# Patient Record
Sex: MALE | Race: WHITE | NOT HISPANIC OR LATINO
[De-identification: names, ages, dates, MRNs, and addresses within clinical notes are randomized per-mention and may not be internally consistent; named-entity substitution may affect disease eponyms.]

---

## 2017-05-14 ENCOUNTER — RESULT REVIEW (OUTPATIENT)
Age: 42
End: 2017-05-14

## 2019-11-05 ENCOUNTER — RESULT REVIEW (OUTPATIENT)
Age: 44
End: 2019-11-05

## 2021-09-02 ENCOUNTER — APPOINTMENT (OUTPATIENT)
Dept: UROLOGY | Facility: CLINIC | Age: 46
End: 2021-09-02
Payer: COMMERCIAL

## 2021-09-02 VITALS
BODY MASS INDEX: 24.38 KG/M2 | TEMPERATURE: 97.9 F | DIASTOLIC BLOOD PRESSURE: 72 MMHG | HEART RATE: 50 BPM | HEIGHT: 72 IN | SYSTOLIC BLOOD PRESSURE: 115 MMHG | WEIGHT: 180 LBS | OXYGEN SATURATION: 99 %

## 2021-09-02 DIAGNOSIS — Z78.9 OTHER SPECIFIED HEALTH STATUS: ICD-10-CM

## 2021-09-02 PROCEDURE — 99203 OFFICE O/P NEW LOW 30 MIN: CPT

## 2021-09-14 PROBLEM — Z78.9 NON-SMOKER: Status: ACTIVE | Noted: 2021-09-14

## 2021-09-14 PROBLEM — Z78.9 ALCOHOL INGESTION: Status: ACTIVE | Noted: 2021-09-14

## 2021-09-14 NOTE — PHYSICAL EXAM
[General Appearance - Well Developed] : well developed [Normal Appearance] : normal appearance [General Appearance - Well Nourished] : well nourished [Well Groomed] : well groomed [General Appearance - In No Acute Distress] : no acute distress [Edema] : no peripheral edema [Respiration, Rhythm And Depth] : normal respiratory rhythm and effort [Exaggerated Use Of Accessory Muscles For Inspiration] : no accessory muscle use [Abdomen Soft] : soft [Abdomen Tenderness] : non-tender [Costovertebral Angle Tenderness] : no ~M costovertebral angle tenderness [Urethral Meatus] : meatus normal [Urinary Bladder Findings] : the bladder was normal on palpation [Scrotum] : the scrotum was normal [Testes Mass (___cm)] : there were no testicular masses [No Prostate Nodules] : no prostate nodules [Normal Station and Gait] : the gait and station were normal for the patient's age [FreeTextEntry1] : palpably normal vasa bilaterally [] : no rash [No Focal Deficits] : no focal deficits [Oriented To Time, Place, And Person] : oriented to person, place, and time [Affect] : the affect was normal [Mood] : the mood was normal [Not Anxious] : not anxious [No Palpable Adenopathy] : no palpable adenopathy

## 2021-09-14 NOTE — HISTORY OF PRESENT ILLNESS
[FreeTextEntry1] : 45 year old M  requesting vasectomy. He has 1 children. He does not want more. His wife is not here with him, but he says that she agrees that she does not want more children. He denies any scrotal abnormalities. He is otherwise without complaint. \par \par No hematuria, no dysuria, no frequency, no urgency, no hesitancy, no straining. No incontinence. \par No fevers, no chills, no nausea, no vomiting, no flank pain. \par \par No family history contributory to request for sterilization

## 2021-09-14 NOTE — ASSESSMENT
[FreeTextEntry1] : 45 year old man requesting vasectomy. Discussed the risks, benefits, and alternatives of bilateral vasectomy. Risks include bleeding, infection, scrotal swelling, hematoma, incomplete vasectomy, spontaneous reanastomosis of vasectomy, incisional pain, testis pain, and abd pain. Discussed with patient that he will not be considered sterile, until azoospermia is demonstrated on semen analysis. All questions and concerns addressed. \par - RTO for vasectomy

## 2021-10-05 ENCOUNTER — APPOINTMENT (OUTPATIENT)
Dept: UROLOGY | Facility: CLINIC | Age: 46
End: 2021-10-05

## 2021-12-01 ENCOUNTER — TRANSCRIPTION ENCOUNTER (OUTPATIENT)
Age: 46
End: 2021-12-01

## 2022-11-17 ENCOUNTER — LABORATORY RESULT (OUTPATIENT)
Age: 47
End: 2022-11-17

## 2022-11-17 ENCOUNTER — APPOINTMENT (OUTPATIENT)
Dept: UROLOGY | Facility: CLINIC | Age: 47
End: 2022-11-17

## 2022-11-17 VITALS
HEART RATE: 104 BPM | SYSTOLIC BLOOD PRESSURE: 117 MMHG | HEIGHT: 72 IN | DIASTOLIC BLOOD PRESSURE: 81 MMHG | WEIGHT: 229 LBS | BODY MASS INDEX: 31.02 KG/M2 | OXYGEN SATURATION: 100 %

## 2022-11-17 PROCEDURE — 55250 REMOVAL OF SPERM DUCT(S): CPT

## 2022-11-17 RX ORDER — TRAMADOL HYDROCHLORIDE 50 MG/1
50 TABLET, COATED ORAL
Qty: 12 | Refills: 0 | Status: ACTIVE | COMMUNITY
Start: 2022-11-17 | End: 1900-01-01

## 2022-11-23 ENCOUNTER — NON-APPOINTMENT (OUTPATIENT)
Age: 47
End: 2022-11-23

## 2022-11-29 ENCOUNTER — APPOINTMENT (OUTPATIENT)
Dept: UROLOGY | Facility: CLINIC | Age: 47
End: 2022-11-29

## 2022-11-29 DIAGNOSIS — Z30.2 ENCOUNTER FOR STERILIZATION: ICD-10-CM

## 2022-11-29 PROCEDURE — 99024 POSTOP FOLLOW-UP VISIT: CPT

## 2022-11-29 NOTE — HISTORY OF PRESENT ILLNESS
[FreeTextEntry1] : 46 year  old M s/p bilateral vasectomy 1 week ago. He is without complaint. No pain, no scrotal swelling, no bruising, no new masses. No hematuria, no dysuria, no frequency, no urgency, no hesitancy, no straining. No incontinence. No fevers, no chills, no nausea, no vomiting, no flank pain.\par Pathology:  normal vas deferens segments, complete cross sections

## 2022-11-29 NOTE — PHYSICAL EXAM
[General Appearance - Well Developed] : well developed [General Appearance - Well Nourished] : well nourished [Normal Appearance] : normal appearance [Well Groomed] : well groomed [General Appearance - In No Acute Distress] : no acute distress [Abdomen Soft] : soft [Abdomen Tenderness] : non-tender [Costovertebral Angle Tenderness] : no ~M costovertebral angle tenderness [Urethral Meatus] : meatus normal [Urinary Bladder Findings] : the bladder was normal on palpation [Scrotum] : the scrotum was normal [Testes Mass (___cm)] : there were no testicular masses [No Prostate Nodules] : no prostate nodules [FreeTextEntry1] : vasectomy sites CDI [Edema] : no peripheral edema [] : no respiratory distress [Respiration, Rhythm And Depth] : normal respiratory rhythm and effort [Exaggerated Use Of Accessory Muscles For Inspiration] : no accessory muscle use [Oriented To Time, Place, And Person] : oriented to person, place, and time [Affect] : the affect was normal [Mood] : the mood was normal [Not Anxious] : not anxious [Normal Station and Gait] : the gait and station were normal for the patient's age [No Focal Deficits] : no focal deficits [No Palpable Adenopathy] : no palpable adenopathy

## 2022-11-29 NOTE — ASSESSMENT
[FreeTextEntry1] : 46 year old M s/p Bilateral Vasectomy, for elective sterilization. Pathology showed complete cross section of unremarkable vas deferens bilaterally. No post operative complications. Patient was instructed to resume sexual activity. He will obtain semen analysis 8 weeks post operatively to confirm azoospermia. Patient understands all above and will call with any issues or questions.\par \par s/p vasectomy\par - RTO after to discuss results

## 2024-08-13 ENCOUNTER — OFFICE (OUTPATIENT)
Dept: URBAN - METROPOLITAN AREA CLINIC 102 | Facility: CLINIC | Age: 49
Setting detail: OPHTHALMOLOGY
End: 2024-08-13
Payer: COMMERCIAL

## 2024-08-13 DIAGNOSIS — H40.013: ICD-10-CM

## 2024-08-13 DIAGNOSIS — D31.02: ICD-10-CM

## 2024-08-13 PROCEDURE — 92020 GONIOSCOPY: CPT | Performed by: OPHTHALMOLOGY

## 2024-08-13 PROCEDURE — 92083 EXTENDED VISUAL FIELD XM: CPT | Performed by: OPHTHALMOLOGY

## 2024-08-13 PROCEDURE — 92250 FUNDUS PHOTOGRAPHY W/I&R: CPT | Performed by: OPHTHALMOLOGY

## 2024-08-13 PROCEDURE — 92004 COMPRE OPH EXAM NEW PT 1/>: CPT | Performed by: OPHTHALMOLOGY

## 2024-08-13 PROCEDURE — 76514 ECHO EXAM OF EYE THICKNESS: CPT | Performed by: OPHTHALMOLOGY

## 2024-08-13 ASSESSMENT — CONFRONTATIONAL VISUAL FIELD TEST (CVF)
OD_FINDINGS: FULL
OS_FINDINGS: FULL

## 2025-05-13 ENCOUNTER — INPATIENT (INPATIENT)
Facility: HOSPITAL | Age: 50
LOS: 13 days | Discharge: HOME | DRG: 885 | End: 2025-05-27
Attending: PSYCHIATRY & NEUROLOGY | Admitting: PSYCHIATRY & NEUROLOGY
Payer: COMMERCIAL

## 2025-05-13 VITALS — HEIGHT: 71 IN | RESPIRATION RATE: 17 BRPM | OXYGEN SATURATION: 100 % | TEMPERATURE: 98 F | WEIGHT: 231.26 LBS

## 2025-05-13 DIAGNOSIS — Z98.52 VASECTOMY STATUS: Chronic | ICD-10-CM

## 2025-05-13 DIAGNOSIS — F12.90 CANNABIS USE, UNSPECIFIED, UNCOMPLICATED: ICD-10-CM

## 2025-05-13 DIAGNOSIS — Z90.89 ACQUIRED ABSENCE OF OTHER ORGANS: Chronic | ICD-10-CM

## 2025-05-13 PROCEDURE — 80178 ASSAY OF LITHIUM: CPT

## 2025-05-13 PROCEDURE — 81003 URINALYSIS AUTO W/O SCOPE: CPT

## 2025-05-13 PROCEDURE — 99232 SBSQ HOSP IP/OBS MODERATE 35: CPT

## 2025-05-13 PROCEDURE — 85025 COMPLETE CBC W/AUTO DIFF WBC: CPT

## 2025-05-13 PROCEDURE — 84443 ASSAY THYROID STIM HORMONE: CPT

## 2025-05-13 PROCEDURE — 80053 COMPREHEN METABOLIC PANEL: CPT

## 2025-05-13 PROCEDURE — 83036 HEMOGLOBIN GLYCOSYLATED A1C: CPT

## 2025-05-13 PROCEDURE — 80061 LIPID PANEL: CPT

## 2025-05-13 PROCEDURE — 36415 COLL VENOUS BLD VENIPUNCTURE: CPT

## 2025-05-13 RX ORDER — DIPHENHYDRAMINE HCL 12.5MG/5ML
50 ELIXIR ORAL ONCE
Refills: 0 | Status: DISCONTINUED | OUTPATIENT
Start: 2025-05-13 | End: 2025-05-27

## 2025-05-13 RX ORDER — MAGNESIUM HYDROXIDE 400 MG/5ML
30 SUSPENSION ORAL DAILY
Refills: 0 | Status: DISCONTINUED | OUTPATIENT
Start: 2025-05-13 | End: 2025-05-27

## 2025-05-13 RX ORDER — ARIPIPRAZOLE 2 MG/1
10 TABLET ORAL DAILY
Refills: 0 | Status: DISCONTINUED | OUTPATIENT
Start: 2025-05-13 | End: 2025-05-13

## 2025-05-13 RX ORDER — DIPHENHYDRAMINE HCL 12.5MG/5ML
50 ELIXIR ORAL EVERY 6 HOURS
Refills: 0 | Status: DISCONTINUED | OUTPATIENT
Start: 2025-05-13 | End: 2025-05-27

## 2025-05-13 RX ORDER — NICOTINE POLACRILEX 4 MG/1
2 GUM, CHEWING ORAL
Refills: 0 | Status: DISCONTINUED | OUTPATIENT
Start: 2025-05-13 | End: 2025-05-27

## 2025-05-13 RX ORDER — ARIPIPRAZOLE 2 MG/1
15 TABLET ORAL DAILY
Refills: 0 | Status: DISCONTINUED | OUTPATIENT
Start: 2025-05-13 | End: 2025-05-14

## 2025-05-13 RX ORDER — HALOPERIDOL 10 MG/1
5 TABLET ORAL ONCE
Refills: 0 | Status: DISCONTINUED | OUTPATIENT
Start: 2025-05-13 | End: 2025-05-27

## 2025-05-13 RX ORDER — LORAZEPAM 4 MG/ML
1 VIAL (ML) INJECTION EVERY 6 HOURS
Refills: 0 | Status: DISCONTINUED | OUTPATIENT
Start: 2025-05-13 | End: 2025-05-20

## 2025-05-13 RX ORDER — HALOPERIDOL 10 MG/1
5 TABLET ORAL EVERY 6 HOURS
Refills: 0 | Status: DISCONTINUED | OUTPATIENT
Start: 2025-05-13 | End: 2025-05-27

## 2025-05-13 RX ORDER — MAGNESIUM, ALUMINUM HYDROXIDE 200-200 MG
30 TABLET,CHEWABLE ORAL EVERY 6 HOURS
Refills: 0 | Status: DISCONTINUED | OUTPATIENT
Start: 2025-05-13 | End: 2025-05-27

## 2025-05-13 RX ORDER — ACETAMINOPHEN 500 MG/5ML
650 LIQUID (ML) ORAL EVERY 6 HOURS
Refills: 0 | Status: DISCONTINUED | OUTPATIENT
Start: 2025-05-13 | End: 2025-05-27

## 2025-05-13 RX ORDER — LORAZEPAM 4 MG/ML
2 VIAL (ML) INJECTION ONCE
Refills: 0 | Status: DISCONTINUED | OUTPATIENT
Start: 2025-05-13 | End: 2025-05-20

## 2025-05-13 RX ADMIN — Medication 1 MILLIGRAM(S): at 21:36

## 2025-05-13 RX ADMIN — HALOPERIDOL 5 MILLIGRAM(S): 10 TABLET ORAL at 23:06

## 2025-05-13 RX ADMIN — Medication 650 MILLIGRAM(S): at 21:37

## 2025-05-13 RX ADMIN — NICOTINE POLACRILEX 2 MILLIGRAM(S): 4 GUM, CHEWING ORAL at 21:36

## 2025-05-13 RX ADMIN — Medication 50 MILLIGRAM(S): at 23:05

## 2025-05-13 RX ADMIN — Medication 650 MILLIGRAM(S): at 22:30

## 2025-05-13 NOTE — BH INPATIENT PSYCHIATRY ASSESSMENT NOTE - NSCOMMENTSUICRISKFACT_PSY_ALL_CORE
The pt currently denies any active SI /HI though he presents currently with a presumptive bipolar affective psychosis

## 2025-05-13 NOTE — BH INPATIENT PSYCHIATRY ASSESSMENT NOTE - RISK ASSESSMENT
the pt presents as a low current risk for suicide but he remains impulsive and thought disordered with poor judgment and insight into his illness  The pt tended to dismiss the safety concerns of all those around him , the pt's insisting that they were overreacting and had no reason to worry. The pt agreed to continue with newly begun Abilify to be increased to 15 mg po q am while the pt continues to decline a restart of likely potentially helpful Lithium to treat his bipolar d/o. The pt denied any current SI /HI /AH and he presented as graciously glib and carefree. The pt's judgment appeared impaired with limited insight at this time.

## 2025-05-13 NOTE — BH INPATIENT PSYCHIATRY ASSESSMENT NOTE - NSBHCHARTREVIEWVS_PSY_A_CORE FT
Vital Signs Last 24 Hrs  T(C): 36.5 (05-13-25 @ 12:00), Max: 36.5 (05-13-25 @ 12:00)  T(F): 97.7 (05-13-25 @ 12:00), Max: 97.7 (05-13-25 @ 12:00)  HR: --  BP: --  BP(mean): --  RR: 17 (05-13-25 @ 12:00) (17 - 17)  SpO2: 100% (05-13-25 @ 12:00) (100% - 100%)    Orthostatic VS  05-13-25 @ 12:00  Lying BP: --/-- HR: --  Sitting BP: 124/92 HR: 88  Standing BP: 123/94 HR: 96  Site: --  Mode: --

## 2025-05-13 NOTE — BH INPATIENT PSYCHIATRY ASSESSMENT NOTE - NSBHMETABOLIC_PSY_ALL_CORE_FT
BMI: BMI (kg/m2): 31.4 (05-13-25 @ 14:36)  HbA1c:   Glucose:   BP: --Vital Signs Last 24 Hrs  T(C): 36.5 (05-13-25 @ 12:00), Max: 36.5 (05-13-25 @ 12:00)  T(F): 97.7 (05-13-25 @ 12:00), Max: 97.7 (05-13-25 @ 12:00)  HR: --  BP: --  BP(mean): --  RR: 17 (05-13-25 @ 12:00) (17 - 17)  SpO2: 100% (05-13-25 @ 12:00) (100% - 100%)    Orthostatic VS  05-13-25 @ 12:00  Lying BP: --/-- HR: --  Sitting BP: 124/92 HR: 88  Standing BP: 123/94 HR: 96  Site: --  Mode: --    Lipid Panel:

## 2025-05-13 NOTE — BH INPATIENT PSYCHIATRY ASSESSMENT NOTE - PATIENT'S CHIEF COMPLAINT
" When my fiancee met me I was very depressed. Maybe I'm  a little hypomanic but now I'm more like myself."

## 2025-05-13 NOTE — H&P ADULT - NS ATTEND AMEND GEN_ALL_CORE FT
49 year old male w bipolar disorder presented to [facility] for manic behaviour.on 05-10  Recently lost his job , was laid off and felt his mood improved.  Worked on renovating his home and purchasing new things and making jokes he was Haider after taking too many gummies.  Last manic episode was 35 years ago.  At [place] he was started on Abilify and was transferred to  for Psych admission for mood stabilization and safety.    We were consulted for required medical examination    HPI, ROS, Hx and exam as are outlined above  Data reviewed        #Bipolar disorder  #Wendy  1. management as per Psych      #Tobacco use  1. refused nicotine patch      #Marijuana use  1. encourage cessation      #Alcohol use  1. encourage cessation      #VTE  1. encourage ambulation      We will be signing off.  Please reconsult as needed        #80 minutes

## 2025-05-13 NOTE — BH PATIENT PROFILE - NSSBIRTFAILEDEXP_GEN_A_CORE
Detail Level: Detailed Quality 226: Preventive Care And Screening: Tobacco Use: Screening And Cessation Intervention: Patient screened for tobacco use and is an ex/non-smoker Never

## 2025-05-13 NOTE — H&P ADULT - HISTORY OF PRESENT ILLNESS
This is a 49 year old male with a past medical/psychiatric history of Bipolar Disorder who originally presented to [facility] on 5/10/2025 for manic behaviors. 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 Patient reports to this  writer, that when he had his job he felt depressed and when he lost his job he actually felt better. States that since loosing his job, he started fixing up his house and, per patient, became manic. Patient then reports that he went to the city with his friends, took too many "gummies" and started "playing around", talking about wiring money, making jokes that he was Haider. Patient states that the  were called he was arrested and brought to [facility] for treatment. Patient states that his last manic episode was about 35 years ago. Per documentation from [facility], patients father reported that over the past week, patient bought a Jet Ski for $30,000, renovating the apartment which was in mint condition, has not been sleeping and has been religiously preoccupied. Patient was evaluated at [facility] and was started on Abilify. Patient was then transferred to [facility] for inpatient psychiatry admission for safety and ongoing patient evaluation and treatment of his bipolar d/o.    Medicine was consulted for medical evaluation. Patient denies any fever, chills, chest pain, SOB, abdominal pain, nausea, vomiting, constipation, diarrhea,  headache, dizziness and all other acute complaints.

## 2025-05-13 NOTE — BH INPATIENT PSYCHIATRY ASSESSMENT NOTE - CURRENT MEDICATION
MEDICATIONS  (STANDING):  ARIPiprazole 15 milliGRAM(s) Oral daily    MEDICATIONS  (PRN):  acetaminophen     Tablet .. 650 milliGRAM(s) Oral every 6 hours PRN Temp greater or equal to 38C (100.4F), Mild Pain (1 - 3), Moderate Pain (4 - 6)  aluminum hydroxide/magnesium hydroxide/simethicone Suspension 30 milliLiter(s) Oral every 6 hours PRN Dyspepsia  diphenhydrAMINE 50 milliGRAM(s) Oral every 6 hours PRN Extrapyramidal prophylaxis  diphenhydrAMINE Injectable 50 milliGRAM(s) IntraMuscular once PRN Extrapyramidal prophylaxis  haloperidol     Tablet 5 milliGRAM(s) Oral every 6 hours PRN mild to moderate agitation due to bipolar psychosis  haloperidol    Injectable 5 milliGRAM(s) IntraMuscular once PRN severe agitation due to affective psychosis  LORazepam     Tablet 1 milliGRAM(s) Oral every 6 hours PRN mild to moderate anxiety  LORazepam   Injectable 2 milliGRAM(s) IntraMuscular once PRN severe agitation due to affective psychosis  magnesium hydroxide Suspension 30 milliLiter(s) Oral daily PRN Constipation  nicotine  Polacrilex Gum 2 milliGRAM(s) Oral every 2 hours PRN Smoking Cessation

## 2025-05-13 NOTE — BH INPATIENT PSYCHIATRY ASSESSMENT NOTE - HPI (INCLUDE ILLNESS QUALITY, SEVERITY, DURATION, TIMING, CONTEXT, MODIFYING FACTORS, ASSOCIATED SIGNS AND SYMPTOMS)
The pt is a 49 yr-old WM diagnosed with juvenile onset bipolar d/o at age 13 , hospitalized once at that time and reportedly successfully treated with Lithium. 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 The pt's parents live nearby and the pt's 16 year-old daughter will be visiting colleges as she plans ahead after high school.     The pt , recently reported by friends and other concerned loved ones, reported safety issues related to what appeared to be a  pt clinical relapse in the context of medication nonadherence and comorbid THC and ETOH use  with resultant pt worsening bipolar manic psychosis  such that the pt was initially brought [facility] after the pt had been acting erratically and reportedly irrationally in [facility].  This writer had frequent phone contact with the ED doctor at [facility] who had begun the pt on Abilify to treat the pt's affective psychosis with grandiose , paranoid, and Spiritism delusional thinking  associated with decreased need for sleep  and flight of ideas with grandiose , well meaning but risky plans the pt wished to implement. The pt had declined Lithium or Depakote restart  to assist with his severe and functionally impairing bipolar d/o and he remained in the Emergency Observation Unit  at [facility] from 5/10/25 until his transfer on 5/13/25 on a 9.27 2 PC legal status to his home town family location  in [place] , to  [facility] for admission to  [facility] inpatient psychiatry for pt safety and for ongoing pt evaluation and treatment of his bipolar d/o.       When seen today on  by this writer, the pt presented as cordial with speech increased in rate and productivity with a rapid succession of grandiose  and possibly injudicious plans for himself, his family and those around him. The pt tended to dismiss the safety concerns of all those around him , the pt's insisting that they were overreacting and had no reason to worry. The pt agreed to continue with newly begun Abilify to be increased to 15 mg po q am while the pt continues to decline a restart of likely potentially helpful Lithium to treat his bipolar d/o. The pt denied any current SI /HI /AH and he presented as graciously glib and carefree. The pt's judgment appeared impaired with limited insight at this time.

## 2025-05-13 NOTE — BH INPATIENT PSYCHIATRY ASSESSMENT NOTE - NSDCCRITERIA_PSY_ALL_CORE
acute resolution of the pt's current bipolar manic psychosis  to assess pt motivation as to ETOH / THC use and harm reduction

## 2025-05-13 NOTE — H&P ADULT - NSICDXFAMILYHX_GEN_ALL_CORE_FT
FAMILY HISTORY:  Mother  Still living? Unknown  FHx: breast cancer, Age at diagnosis: Age Unknown    Sibling  Still living? Unknown  FHx: leukemia, Age at diagnosis: Age Unknown

## 2025-05-13 NOTE — H&P ADULT - ASSESSMENT
49 year old male with a past medical/psychiatric history of Bipolar Disorder who originally presented to [facility] on 5/10/2025 for manic behaviors and was then transferred to [facility] on 5/13/2025 for inpatient psychiatry admission for safety and ongoing patient evaluation and treatment of his bipolar disorder.     Plan  Bipolar disorder   - Management per psychiatry     Smoking history   - Encourage smoking cessation   - Refused Nicotine patch   - Nicotine gum [id number] PRN     Marijuana use   - Encourage cessation    Alcohol use   - encourage cessation     DVT PPX   - Encourage ambulation

## 2025-05-13 NOTE — BH INPATIENT PSYCHIATRY ASSESSMENT NOTE - NSBHASSESSSUMMFT_PSY_ALL_CORE
The pt is a 49 yr-old WM diagnosed with juvenile onset bipolar d/o at age 13 , hospitalized once at that time and reportedly successfully treated with Lithium. 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 The pt's parents live nearby and the pt's 16 year-old daughter will be visiting colleges as she plans ahead after high school.     The pt , recently reported by friends and other concerned loved ones, reported safety issues related to what appeared to be a  pt clinical relapse in the context of medication nonadherence and comorbid THC and ETOH use  with resultant pt worsening bipolar manic psychosis  such that the pt was initially brought [facility] after the pt had been acting erratically and reportedly irrationally in [facility].  This writer had frequent phone contact with the ED doctor at [facility] who had begun the pt on Abilify to treat the pt's affective psychosis with grandiose , paranoid, and Christian delusional thinking  associated with decreased need for sleep  and flight of ideas with grandiose , well meaning but risky plans the pt wished to implement. The pt had declined Lithium or Depakote restart  to assist with his severe and functionally impairing bipolar d/o and he remained in the Emergency Observation Unit  at [facility] from 5/10/25 until his transfer on 5/13/25 on a 9.27 2 PC legal status to his home town family location  in [place] , to  [facility] for admission to  [facility] inpatient psychiatry for pt safety and for ongoing pt evaluation and treatment of his bipolar d/o.       When seen today on  by this writer, the pt presented as cordial with speech increased in rate and productivity with a rapid succession of grandiose  and possibly injudicious plans for himself, his family and those around him. The pt tended to dismiss the safety concerns of all those around him , the pt's insisting that they were overreacting and had no reason to worry. The pt agreed to continue with newly begun Abilify to be increased to 15 mg po q am while the pt continues to decline a restart of likely potentially helpful Lithium to treat his bipolar d/o. The pt denied any current SI /HI /AH and he presented as graciously glib and carefree. The pt's judgment appeared impaired with limited insight at this time.

## 2025-05-14 LAB
A1C WITH ESTIMATED AVERAGE GLUCOSE RESULT: 5.5 % — SIGNIFICANT CHANGE UP (ref 4–5.6)
APPEARANCE UR: CLEAR — SIGNIFICANT CHANGE UP
BASOPHILS # BLD AUTO: 0.05 K/UL — SIGNIFICANT CHANGE UP (ref 0–0.2)
BASOPHILS NFR BLD AUTO: 0.9 % — SIGNIFICANT CHANGE UP (ref 0–2)
BILIRUB UR-MCNC: NEGATIVE — SIGNIFICANT CHANGE UP
CHOLEST SERPL-MCNC: 203 MG/DL — HIGH
COLOR SPEC: YELLOW — SIGNIFICANT CHANGE UP
DIFF PNL FLD: NEGATIVE — SIGNIFICANT CHANGE UP
EOSINOPHIL # BLD AUTO: 0.22 K/UL — SIGNIFICANT CHANGE UP (ref 0–0.5)
EOSINOPHIL NFR BLD AUTO: 3.9 % — SIGNIFICANT CHANGE UP (ref 0–6)
ESTIMATED AVERAGE GLUCOSE: 111 MG/DL — SIGNIFICANT CHANGE UP (ref 68–114)
GLUCOSE UR QL: NEGATIVE MG/DL — SIGNIFICANT CHANGE UP
HCT VFR BLD CALC: 43.3 % — SIGNIFICANT CHANGE UP (ref 39–50)
HDLC SERPL-MCNC: 102 MG/DL — SIGNIFICANT CHANGE UP
HGB BLD-MCNC: 14.4 G/DL — SIGNIFICANT CHANGE UP (ref 13–17)
IMM GRANULOCYTES # BLD AUTO: 0.03 K/UL — SIGNIFICANT CHANGE UP (ref 0–0.07)
IMM GRANULOCYTES NFR BLD AUTO: 0.5 % — SIGNIFICANT CHANGE UP (ref 0–0.9)
KETONES UR QL: NEGATIVE MG/DL — SIGNIFICANT CHANGE UP
LDLC SERPL-MCNC: 82 MG/DL — SIGNIFICANT CHANGE UP
LEUKOCYTE ESTERASE UR-ACNC: NEGATIVE — SIGNIFICANT CHANGE UP
LIPID PNL WITH DIRECT LDL SERPL: 82 MG/DL — SIGNIFICANT CHANGE UP
LYMPHOCYTES # BLD AUTO: 1.81 K/UL — SIGNIFICANT CHANGE UP (ref 1–3.3)
LYMPHOCYTES NFR BLD AUTO: 32.2 % — SIGNIFICANT CHANGE UP (ref 13–44)
MCHC RBC-ENTMCNC: 32.1 PG — SIGNIFICANT CHANGE UP (ref 27–34)
MCHC RBC-ENTMCNC: 33.3 G/DL — SIGNIFICANT CHANGE UP (ref 32–36)
MCV RBC AUTO: 96.4 FL — SIGNIFICANT CHANGE UP (ref 80–100)
MONOCYTES # BLD AUTO: 0.61 K/UL — SIGNIFICANT CHANGE UP (ref 0–0.9)
MONOCYTES NFR BLD AUTO: 10.9 % — SIGNIFICANT CHANGE UP (ref 2–14)
NEUTROPHILS # BLD AUTO: 2.9 K/UL — SIGNIFICANT CHANGE UP (ref 1.8–7.4)
NEUTROPHILS NFR BLD AUTO: 51.6 % — SIGNIFICANT CHANGE UP (ref 43–77)
NITRITE UR-MCNC: NEGATIVE — SIGNIFICANT CHANGE UP
NONHDLC SERPL-MCNC: 101 MG/DL — SIGNIFICANT CHANGE UP
NRBC # BLD AUTO: 0 K/UL — SIGNIFICANT CHANGE UP (ref 0–0)
NRBC # FLD: 0 K/UL — SIGNIFICANT CHANGE UP (ref 0–0)
NRBC BLD AUTO-RTO: 0 /100 WBCS — SIGNIFICANT CHANGE UP (ref 0–0)
PH UR: 7 — SIGNIFICANT CHANGE UP (ref 5–8)
PLATELET # BLD AUTO: 353 K/UL — SIGNIFICANT CHANGE UP (ref 150–400)
PMV BLD: 9.8 FL — SIGNIFICANT CHANGE UP (ref 7–13)
PROT UR-MCNC: NEGATIVE MG/DL — SIGNIFICANT CHANGE UP
RBC # BLD: 4.49 M/UL — SIGNIFICANT CHANGE UP (ref 4.2–5.8)
RBC # FLD: 13 % — SIGNIFICANT CHANGE UP (ref 10.3–14.5)
SP GR SPEC: 1.01 — SIGNIFICANT CHANGE UP (ref 1–1.03)
TRIGL SERPL-MCNC: 109 MG/DL — SIGNIFICANT CHANGE UP
TSH SERPL-MCNC: 2.91 UU/ML — SIGNIFICANT CHANGE UP (ref 0.34–4.82)
UROBILINOGEN FLD QL: 0.2 MG/DL — SIGNIFICANT CHANGE UP (ref 0.2–1)
WBC # BLD: 5.62 K/UL — SIGNIFICANT CHANGE UP (ref 3.8–10.5)
WBC # FLD AUTO: 5.62 K/UL — SIGNIFICANT CHANGE UP (ref 3.8–10.5)

## 2025-05-14 PROCEDURE — 99232 SBSQ HOSP IP/OBS MODERATE 35: CPT

## 2025-05-14 RX ORDER — QUETIAPINE FUMARATE 25 MG/1
50 TABLET ORAL AT BEDTIME
Refills: 0 | Status: DISCONTINUED | OUTPATIENT
Start: 2025-05-14 | End: 2025-05-15

## 2025-05-14 RX ORDER — LITHIUM CARBONATE 600 MG/1
300 CAPSULE, GELATIN COATED ORAL AT BEDTIME
Refills: 0 | Status: DISCONTINUED | OUTPATIENT
Start: 2025-05-14 | End: 2025-05-15

## 2025-05-14 RX ORDER — LITHIUM CARBONATE 600 MG/1
150 CAPSULE, GELATIN COATED ORAL DAILY
Refills: 0 | Status: DISCONTINUED | OUTPATIENT
Start: 2025-05-14 | End: 2025-05-15

## 2025-05-14 RX ADMIN — LITHIUM CARBONATE 300 MILLIGRAM(S): 600 CAPSULE, GELATIN COATED ORAL at 21:19

## 2025-05-14 RX ADMIN — QUETIAPINE FUMARATE 50 MILLIGRAM(S): 25 TABLET ORAL at 21:19

## 2025-05-14 RX ADMIN — Medication 1 MILLIGRAM(S): at 21:21

## 2025-05-14 RX ADMIN — ARIPIPRAZOLE 15 MILLIGRAM(S): 2 TABLET ORAL at 09:18

## 2025-05-14 RX ADMIN — NICOTINE POLACRILEX 2 MILLIGRAM(S): 4 GUM, CHEWING ORAL at 18:53

## 2025-05-14 NOTE — BH SOCIAL WORK INITIAL PSYCHOSOCIAL EVALUATION - NSBHHOME_PSY_ALL_CORE
Pt reports 50/50% custody of his 15 y/o daughter. Pt reports daughter is currently with her mother during this time./Home with children under age 18

## 2025-05-14 NOTE — BH SOCIAL WORK INITIAL PSYCHOSOCIAL EVALUATION - OTHER PAST PSYCHIATRIC HISTORY (INCLUDE DETAILS REGARDING ONSET, COURSE OF ILLNESS, INPATIENT/OUTPATIENT TREATMENT)
Pt is a 50 y/o,  male currently domiciled at a private residence in [place]. Pt presented to [facility] on 5/10 as per EMR and was transferred to [address] on 5/13. As per EMR pt has a past hx of severe bipolar dx with psychosis affective dx. Pt age of onset of symptoms 13 with a hospitalization at that age. Successful past tx hx with Lithium as per EMR.  Abilify 15 mg was given to pt and pt currently denies Lithium at this time.     At the time of interview pt presents     Judgement and insight appear to be  Pt is a 48 y/o,  male currently domiciled at a private residence in [place]. Pt presented to [facility] on 5/10 as per EMR and was transferred to [address] on 5/13. As per EMR pt has a past hx of severe bipolar dx with psychosis affective dx. Pt age of onset of symptoms 13 with a hospitalization at that age. Successful past tx hx with Lithium as per EMR. Abilify 15 mg was given to pt and pt currently denies Lithium at this time. As per pt Lithium was unsuccessful and caused him lack of emotions.     At the time of interview pt presents as pleasant and remains is discharged focused. Pt denies SI/HI VH/AH. Pt reports that he was brought to the hospital after he had "an incident in [facility]". Pt reports that prior to hospitalize he was walking around the streets asking people to tattoo him with a pen. Pt reported he was making attempts to partake in high risk financial endeavours. Pt reports he was drinking alcohol, utilizing marijuana gummies, and drinking energy drinks/coffee with a decrease need for sleep. Pt reported to friends command hallucinations/SI and friends called 911 leading to hospitalization.      Judgement and insight appear to be hindered due to presenting current mental status. Pt reports he was currently prescribed Cymbalta, Adderall, Wellbutrin, and was prescribed vraylar however stopped medication due it "slowing him down".  Pt reports wanting to return to his current psychiatric NP and psychologist for after care.

## 2025-05-14 NOTE — BH SOCIAL WORK INITIAL PSYCHOSOCIAL EVALUATION - NSBHLEGALOTHER_PSY_ALL_CORE
Pt reports a divorce 15 years ago from his daughter's mother. Pt reported they were  for one year. Pt reports having 50/50% custody of his daughter./Divorce

## 2025-05-14 NOTE — BH SOCIAL WORK INITIAL PSYCHOSOCIAL EVALUATION - NSBHEMPLOYEDYN_PSY_ALL_CORE
Pt reported he had a job for 28 years until 4 years ago when he was let go. 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

## 2025-05-14 NOTE — BH INPATIENT PSYCHIATRY PROGRESS NOTE - NSBHFUPINTERVALHXFT_PSY_A_CORE
5/2=14/25 Pt seen and discussed with treatment team. This writer had pt permission to speak with the pt's nino ( wedding is planned in the next few months) . Lengthy phone conversation with nino who noted being with the pt x the past 3.5 years.  Reportedly, 6 months into their relationship, the pt had become severely depressed and unable to barely function for awhile.  Short thereafter, the pt appeared to move into a bipolar manic clinical state where he presented as " loud  and with a tsunami of racing thoughts." She spoke of the pt's h/o NP treatment with Adderall, Cymbalta , Vraylar and Wellbutrin with collateral reported pt worsening symptoms of bipolar ranjeet with psychosis which subsequently led to the pt's need for emergency hospitalization.     Lengthy discussion with the pt and his fiancee during family visiting time on the unit during which time writer reviewed with the pt the recommendation for the pt to resume a true evidence based mood stabilizer like Lithium ( effective for pt in the past) or Depakote , tegretol or Trileptal. The pt was amenable to restart Lithium with a start low , go slow approach  of Lithium 150 mg po q am and 300 mg po qhs  with hs dose to be greater than morning dose to decrease pt risk of daytime sedation. Also discussed recommendation to DC recently begun Abilify and to switch to hs Seroquel 50 mg po qhs to further aid with alleviating pt bipolar manic psychosis and assisting with the pt's insomnia.  Pt was amenable to this plan which was reviewed later with the pt's RN staff. The pt appeared somewhat anxious and disappointed with the news he needed to remain inpatient awhile longer to more fully and effectively treat his bipolar d/o. The pt denies current SI / HI / AH but he remains encouraged by family and fiancee to give treatment plan a chance.

## 2025-05-14 NOTE — BH SOCIAL WORK INITIAL PSYCHOSOCIAL EVALUATION - DETAILS
Pt reports father, paternal grandfather and biological brother have depression. Pt reports that he has cousins who have schizophrenia.

## 2025-05-14 NOTE — BH SOCIAL WORK INITIAL PSYCHOSOCIAL EVALUATION - NSBHSAALC_PSY_A_CORE FT
Pt reports drinking 2-4 drinks per day on the weekdays. Pt reports drinking 6+ on the weekend. Pt reports last use 5/9.

## 2025-05-14 NOTE — BH SOCIAL WORK INITIAL PSYCHOSOCIAL EVALUATION - NSBHCHILDBEHAVIOR_PSY_ALL_CORE
Pt reports always being social, having a lot of energy and being enrolled in a gifted and talented program during his adolescence.

## 2025-05-14 NOTE — BH INPATIENT PSYCHIATRY PROGRESS NOTE - NSBHATTESTBILLING_PSY_A_CORE
[id number] OBS or IP - moderate complexity OR 35-49 mins Nsaids Pregnancy And Lactation Text: These medications are considered safe up to 30 weeks gestation. It is excreted in breast milk.

## 2025-05-14 NOTE — BH SOCIAL WORK INITIAL PSYCHOSOCIAL EVALUATION - NSCMSPTSTRENGTHS_PSY_ALL_CORE
Financial stability/Future/goal oriented/Highly motivated for treatment/Intelligence/Strong support system

## 2025-05-15 PROCEDURE — 99232 SBSQ HOSP IP/OBS MODERATE 35: CPT

## 2025-05-15 RX ORDER — LITHIUM CARBONATE 600 MG/1
600 CAPSULE, GELATIN COATED ORAL AT BEDTIME
Refills: 0 | Status: DISCONTINUED | OUTPATIENT
Start: 2025-05-15 | End: 2025-05-27

## 2025-05-15 RX ORDER — QUETIAPINE FUMARATE 25 MG/1
100 TABLET ORAL AT BEDTIME
Refills: 0 | Status: DISCONTINUED | OUTPATIENT
Start: 2025-05-15 | End: 2025-05-27

## 2025-05-15 RX ORDER — SENNA 187 MG
2 TABLET ORAL AT BEDTIME
Refills: 0 | Status: DISCONTINUED | OUTPATIENT
Start: 2025-05-15 | End: 2025-05-27

## 2025-05-15 RX ADMIN — Medication 1 MILLIGRAM(S): at 20:52

## 2025-05-15 RX ADMIN — LITHIUM CARBONATE 150 MILLIGRAM(S): 600 CAPSULE, GELATIN COATED ORAL at 08:53

## 2025-05-15 RX ADMIN — NICOTINE POLACRILEX 2 MILLIGRAM(S): 4 GUM, CHEWING ORAL at 11:06

## 2025-05-15 RX ADMIN — NICOTINE POLACRILEX 2 MILLIGRAM(S): 4 GUM, CHEWING ORAL at 20:52

## 2025-05-15 RX ADMIN — Medication 2 TABLET(S): at 20:52

## 2025-05-15 RX ADMIN — NICOTINE POLACRILEX 2 MILLIGRAM(S): 4 GUM, CHEWING ORAL at 08:56

## 2025-05-15 RX ADMIN — MAGNESIUM HYDROXIDE 30 MILLILITER(S): 400 SUSPENSION ORAL at 12:01

## 2025-05-15 RX ADMIN — QUETIAPINE FUMARATE 100 MILLIGRAM(S): 25 TABLET ORAL at 20:52

## 2025-05-15 RX ADMIN — LITHIUM CARBONATE 600 MILLIGRAM(S): 600 CAPSULE, GELATIN COATED ORAL at 20:52

## 2025-05-15 RX ADMIN — NICOTINE POLACRILEX 2 MILLIGRAM(S): 4 GUM, CHEWING ORAL at 14:00

## 2025-05-15 NOTE — BH INPATIENT PSYCHIATRY PROGRESS NOTE - NSBHFUPINTERVALHXFT_PSY_A_CORE
5/14/25 Pt seen and discussed with treatment team. This writer had pt permission to speak with the pt's nino ( wedding is planned in the next few months) . Lengthy phone conversation with nino who noted being with the pt x the past 3.5 years.  Reportedly, 6 months into their relationship, the pt had become severely depressed and unable to barely function for awhile.  Short thereafter, the pt appeared to move into a bipolar manic clinical state where he presented as " loud  and with a tsunami of racing thoughts." She spoke of the pt's h/o NP treatment with Adderall, Cymbalta , Vraylar and Wellbutrin with collateral reported pt worsening symptoms of bipolar ranjeet with psychosis which subsequently led to the pt's need for emergency hospitalization.     Lengthy discussion with the pt and his fiancee during family visiting time on the unit during which time writer reviewed with the pt the recommendation for the pt to resume a true evidence based mood stabilizer like Lithium ( effective for pt in the past) or Depakote , tegretol or Trileptal. The pt was amenable to restart Lithium with a start low , go slow approach  of Lithium 150 mg po q am and 300 mg po qhs  with hs dose to be greater than morning dose to decrease pt risk of daytime sedation. Also discussed recommendation to DC recently begun Abilify and to switch to hs Seroquel 50 mg po qhs to further aid with alleviating pt bipolar manic psychosis and assisting with the pt's insomnia.  Pt was amenable to this plan which was reviewed later with the pt's RN staff. The pt appeared somewhat anxious and disappointed with the news he needed to remain inpatient awhile longer to more fully and effectively treat his bipolar d/o. The pt denies current SI / HI / AH but he remains encouraged by family and fiancee to give treatment plan a chance.

## 2025-05-16 PROCEDURE — 99232 SBSQ HOSP IP/OBS MODERATE 35: CPT

## 2025-05-16 RX ORDER — TRIAMCINOLONE ACETONIDE 1 MG/G
1 CREAM TOPICAL
Refills: 0 | Status: DISCONTINUED | OUTPATIENT
Start: 2025-05-16 | End: 2025-05-27

## 2025-05-16 RX ADMIN — LITHIUM CARBONATE 600 MILLIGRAM(S): 600 CAPSULE, GELATIN COATED ORAL at 21:03

## 2025-05-16 RX ADMIN — Medication 2 TABLET(S): at 21:02

## 2025-05-16 RX ADMIN — QUETIAPINE FUMARATE 100 MILLIGRAM(S): 25 TABLET ORAL at 21:03

## 2025-05-16 RX ADMIN — Medication 1 MILLIGRAM(S): at 21:03

## 2025-05-16 RX ADMIN — Medication 50 MILLIGRAM(S): at 23:02

## 2025-05-17 PROCEDURE — 99232 SBSQ HOSP IP/OBS MODERATE 35: CPT

## 2025-05-17 RX ADMIN — LITHIUM CARBONATE 600 MILLIGRAM(S): 600 CAPSULE, GELATIN COATED ORAL at 20:17

## 2025-05-17 RX ADMIN — Medication 2 TABLET(S): at 20:17

## 2025-05-17 RX ADMIN — TRIAMCINOLONE ACETONIDE 1 APPLICATION(S): 1 CREAM TOPICAL at 20:20

## 2025-05-17 RX ADMIN — QUETIAPINE FUMARATE 100 MILLIGRAM(S): 25 TABLET ORAL at 20:17

## 2025-05-17 RX ADMIN — NICOTINE POLACRILEX 2 MILLIGRAM(S): 4 GUM, CHEWING ORAL at 20:17

## 2025-05-17 RX ADMIN — NICOTINE POLACRILEX 2 MILLIGRAM(S): 4 GUM, CHEWING ORAL at 13:15

## 2025-05-17 NOTE — BH INPATIENT PSYCHIATRY PROGRESS NOTE - NSBHFUPINTERVALHXFT_PSY_A_CORE
covering note 5/17/2025 Pt with good eye contact Alert Pt with increased goal directed speech.    covering note 5/17/2025 Pt with good eye contact Alert Pt with increased goal directed speech. Pt claiming that he is feeling well and hoping to be able to be discharged home soon

## 2025-05-18 PROCEDURE — 99232 SBSQ HOSP IP/OBS MODERATE 35: CPT

## 2025-05-18 RX ADMIN — LITHIUM CARBONATE 600 MILLIGRAM(S): 600 CAPSULE, GELATIN COATED ORAL at 20:08

## 2025-05-18 RX ADMIN — NICOTINE POLACRILEX 2 MILLIGRAM(S): 4 GUM, CHEWING ORAL at 20:08

## 2025-05-18 RX ADMIN — Medication 2 TABLET(S): at 20:08

## 2025-05-18 RX ADMIN — QUETIAPINE FUMARATE 100 MILLIGRAM(S): 25 TABLET ORAL at 20:08

## 2025-05-18 RX ADMIN — TRIAMCINOLONE ACETONIDE 1 APPLICATION(S): 1 CREAM TOPICAL at 20:07

## 2025-05-18 NOTE — BH INPATIENT PSYCHIATRY PROGRESS NOTE - NSBHPSYCHOLCOGABN_PSY_A_CORE
disoriented to situation

## 2025-05-18 NOTE — BH INPATIENT PSYCHIATRY PROGRESS NOTE - NSBHFUPINTERVALHXFT_PSY_A_CORE
5/18/2025  covering note pt not appearing in distress. Pt is pleasant and social . Pt claiming that he is feeling  better  and focused on discharge  covering note 5/17/2025 Pt with good eye contact Alert Pt with increased goal directed speech. Pt claiming that he is feeling well and hoping to be able to be discharged home soon

## 2025-05-18 NOTE — BH INPATIENT PSYCHIATRY PROGRESS NOTE - ORIENTATION
pt mistakenly believes he has no significant illness that would require any inpatient treatment

## 2025-05-19 DIAGNOSIS — Z87.898 PERSONAL HISTORY OF OTHER SPECIFIED CONDITIONS: ICD-10-CM

## 2025-05-19 PROCEDURE — 99232 SBSQ HOSP IP/OBS MODERATE 35: CPT

## 2025-05-19 RX ADMIN — Medication 2 TABLET(S): at 20:23

## 2025-05-19 RX ADMIN — TRIAMCINOLONE ACETONIDE 1 APPLICATION(S): 1 CREAM TOPICAL at 20:23

## 2025-05-19 RX ADMIN — LITHIUM CARBONATE 600 MILLIGRAM(S): 600 CAPSULE, GELATIN COATED ORAL at 20:22

## 2025-05-19 RX ADMIN — NICOTINE POLACRILEX 2 MILLIGRAM(S): 4 GUM, CHEWING ORAL at 20:26

## 2025-05-19 RX ADMIN — TRIAMCINOLONE ACETONIDE 1 APPLICATION(S): 1 CREAM TOPICAL at 09:39

## 2025-05-19 RX ADMIN — QUETIAPINE FUMARATE 100 MILLIGRAM(S): 25 TABLET ORAL at 20:23

## 2025-05-19 RX ADMIN — NICOTINE POLACRILEX 2 MILLIGRAM(S): 4 GUM, CHEWING ORAL at 09:39

## 2025-05-19 RX ADMIN — NICOTINE POLACRILEX 2 MILLIGRAM(S): 4 GUM, CHEWING ORAL at 06:06

## 2025-05-19 NOTE — BH INPATIENT PSYCHIATRY PROGRESS NOTE - NSBHFUPINTERVALHXFT_PSY_A_CORE
5/18/2025  covering note pt not appearing in distress. Pt is pleasant and social . Pt claiming that he is feeling  better  and focused on discharge  covering note 5/17/2025 Pt with good eye contact Alert Pt with increased goal directed speech. Pt claiming that he is feeling well and hoping to be able to be discharged home soon  5/19/25 Pt seen in the day room and discussed with treatment team. The pt remains with symptoms of hypomania with rapid speech that is overinclusive, circumstantial and not fully logical though ultimately goal directed. The pt reported good sleep and appetite and proceeded to continue  his efforts at minimizing the nature and severity of his bipolar disorder along with his h/o ETOH and THC use disorders. The pt gave writer permission to contact the pt's parents after the pt initially had expressed some reluctance due to his perception that his family was trying to control and to limit what he could and could not do. The pt was able to concede that his family and friends could also be genuinely worried as to his clinical health and well being.  The pt is tolerating his current med regimen  of Lithium 600 mg po qhs and Seroquel 100 mg po qhs  in an effort to alleviate the pt's severe and functionally impairing bipolar affective psychosis.  Though the pt presents as  hypomanic with evidence of some ongoing and  grandiose based illogical thinking , the pt denies any current SI /HI /AH.

## 2025-05-19 NOTE — BH SAFETY PLAN - WARNING SIGN 1
Trigger: Loosing my job. Felt a loss of identity and need for attention.    Fiance was away for the week. Stopped taking my Vraylor.

## 2025-05-19 NOTE — BH SAFETY PLAN - DISTRACTION PLACE 1
An attempt was made to contact the patient to schedule their follow up appointment with Dr. Quintana.      Per the Follow-Up and Disposition Report, the patient is due for CPX on 6/27/2023.    LEOBARDO    
Gym

## 2025-05-19 NOTE — BH INPATIENT PSYCHIATRY PROGRESS NOTE - OTHER
rapid speech, slowly becoming better modulated in rate, volume and productivity very social but becoming better able to establish and to maintain boundaries ongoing though gradually less intense grandiose and paranoid delusional thinking affect slowly becoming less labile  cooperative in a  hypomanic clinical state improving "I feel really good." pt had allowed peer to paint his fingernails black after which the pt began scratching the polish off   hyper focussed but slowly improving

## 2025-05-20 LAB — LITHIUM SERPL-MCNC: 0.4 MMOL/L — LOW (ref 0.6–1.2)

## 2025-05-20 PROCEDURE — 99232 SBSQ HOSP IP/OBS MODERATE 35: CPT

## 2025-05-20 RX ORDER — LORAZEPAM 4 MG/ML
1 VIAL (ML) INJECTION EVERY 6 HOURS
Refills: 0 | Status: DISCONTINUED | OUTPATIENT
Start: 2025-05-20 | End: 2025-05-27

## 2025-05-20 RX ADMIN — QUETIAPINE FUMARATE 100 MILLIGRAM(S): 25 TABLET ORAL at 20:08

## 2025-05-20 RX ADMIN — LITHIUM CARBONATE 600 MILLIGRAM(S): 600 CAPSULE, GELATIN COATED ORAL at 20:08

## 2025-05-20 RX ADMIN — TRIAMCINOLONE ACETONIDE 1 APPLICATION(S): 1 CREAM TOPICAL at 08:26

## 2025-05-20 RX ADMIN — Medication 2 TABLET(S): at 20:08

## 2025-05-20 RX ADMIN — NICOTINE POLACRILEX 2 MILLIGRAM(S): 4 GUM, CHEWING ORAL at 08:27

## 2025-05-20 RX ADMIN — TRIAMCINOLONE ACETONIDE 1 APPLICATION(S): 1 CREAM TOPICAL at 20:12

## 2025-05-20 RX ADMIN — Medication 1 MILLIGRAM(S): at 22:50

## 2025-05-20 RX ADMIN — NICOTINE POLACRILEX 2 MILLIGRAM(S): 4 GUM, CHEWING ORAL at 10:40

## 2025-05-20 RX ADMIN — NICOTINE POLACRILEX 2 MILLIGRAM(S): 4 GUM, CHEWING ORAL at 20:07

## 2025-05-20 NOTE — BH INPATIENT PSYCHIATRY PROGRESS NOTE - OTHER
very social but becoming better able to establish and to maintain boundaries affect becoming more appropriate in range and intensity improving becoming more linear normalizing  cooperative in a gradually more euthymic clinical state becoming more euthymic thinking becoming more reality based "I feel good now." speech  slowly becoming better modulated in rate, volume and productivity

## 2025-05-21 LAB
ALBUMIN SERPL ELPH-MCNC: 4.1 G/DL — SIGNIFICANT CHANGE UP (ref 3.3–5)
ALP SERPL-CCNC: 67 U/L — SIGNIFICANT CHANGE UP (ref 40–120)
ALT FLD-CCNC: 38 U/L — SIGNIFICANT CHANGE UP (ref 12–78)
ANION GAP SERPL CALC-SCNC: 3 MMOL/L — LOW (ref 5–17)
AST SERPL-CCNC: 21 U/L — SIGNIFICANT CHANGE UP (ref 15–37)
BILIRUB SERPL-MCNC: 0.4 MG/DL — SIGNIFICANT CHANGE UP (ref 0.2–1.2)
BUN SERPL-MCNC: 14 MG/DL — SIGNIFICANT CHANGE UP (ref 7–23)
CALCIUM SERPL-MCNC: 10 MG/DL — SIGNIFICANT CHANGE UP (ref 8.5–10.1)
CHLORIDE SERPL-SCNC: 105 MMOL/L — SIGNIFICANT CHANGE UP (ref 96–108)
CO2 SERPL-SCNC: 31 MMOL/L — SIGNIFICANT CHANGE UP (ref 22–31)
CREAT SERPL-MCNC: 1.09 MG/DL — SIGNIFICANT CHANGE UP (ref 0.5–1.3)
EGFR: 82 ML/MIN/1.73M2 — SIGNIFICANT CHANGE UP
EGFR: 82 ML/MIN/1.73M2 — SIGNIFICANT CHANGE UP
GLUCOSE SERPL-MCNC: 100 MG/DL — HIGH (ref 70–99)
POTASSIUM SERPL-MCNC: 4.3 MMOL/L — SIGNIFICANT CHANGE UP (ref 3.5–5.3)
POTASSIUM SERPL-SCNC: 4.3 MMOL/L — SIGNIFICANT CHANGE UP (ref 3.5–5.3)
PROT SERPL-MCNC: 7.3 GM/DL — SIGNIFICANT CHANGE UP (ref 6–8.3)
SODIUM SERPL-SCNC: 139 MMOL/L — SIGNIFICANT CHANGE UP (ref 135–145)

## 2025-05-21 PROCEDURE — 99232 SBSQ HOSP IP/OBS MODERATE 35: CPT

## 2025-05-21 RX ORDER — LIDOCAINE HYDROCHLORIDE 20 MG/ML
10 JELLY TOPICAL THREE TIMES A DAY
Refills: 0 | Status: DISCONTINUED | OUTPATIENT
Start: 2025-05-21 | End: 2025-05-27

## 2025-05-21 RX ORDER — LITHIUM CARBONATE 600 MG/1
150 CAPSULE, GELATIN COATED ORAL DAILY
Refills: 0 | Status: DISCONTINUED | OUTPATIENT
Start: 2025-05-21 | End: 2025-05-21

## 2025-05-21 RX ORDER — LITHIUM CARBONATE 600 MG/1
150 CAPSULE, GELATIN COATED ORAL DAILY
Refills: 0 | Status: DISCONTINUED | OUTPATIENT
Start: 2025-05-21 | End: 2025-05-27

## 2025-05-21 RX ADMIN — NICOTINE POLACRILEX 2 MILLIGRAM(S): 4 GUM, CHEWING ORAL at 08:55

## 2025-05-21 RX ADMIN — NICOTINE POLACRILEX 2 MILLIGRAM(S): 4 GUM, CHEWING ORAL at 12:04

## 2025-05-21 RX ADMIN — NICOTINE POLACRILEX 2 MILLIGRAM(S): 4 GUM, CHEWING ORAL at 16:28

## 2025-05-21 RX ADMIN — NICOTINE POLACRILEX 2 MILLIGRAM(S): 4 GUM, CHEWING ORAL at 21:37

## 2025-05-21 RX ADMIN — TRIAMCINOLONE ACETONIDE 1 APPLICATION(S): 1 CREAM TOPICAL at 21:38

## 2025-05-21 RX ADMIN — Medication 1 MILLIGRAM(S): at 22:09

## 2025-05-21 RX ADMIN — TRIAMCINOLONE ACETONIDE 1 APPLICATION(S): 1 CREAM TOPICAL at 08:56

## 2025-05-21 RX ADMIN — LITHIUM CARBONATE 600 MILLIGRAM(S): 600 CAPSULE, GELATIN COATED ORAL at 21:38

## 2025-05-21 RX ADMIN — LIDOCAINE HYDROCHLORIDE 10 MILLILITER(S): 20 JELLY TOPICAL at 13:32

## 2025-05-21 RX ADMIN — QUETIAPINE FUMARATE 100 MILLIGRAM(S): 25 TABLET ORAL at 21:38

## 2025-05-21 RX ADMIN — Medication 2 TABLET(S): at 21:38

## 2025-05-21 RX ADMIN — LIDOCAINE HYDROCHLORIDE 10 MILLILITER(S): 20 JELLY TOPICAL at 21:37

## 2025-05-21 NOTE — BH INPATIENT PSYCHIATRY PROGRESS NOTE - OTHER
cooperative in a gradually more euthymic clinical state normalizing thinking becoming more reality based very social but becoming better able to establish and to maintain boundaries affect becoming more appropriate in range and intensity becoming more euthymic improving becoming more linear "I feel good now." speech  slowly becoming better modulated in rate, volume and productivity

## 2025-05-22 PROCEDURE — 99232 SBSQ HOSP IP/OBS MODERATE 35: CPT

## 2025-05-22 RX ADMIN — LIDOCAINE HYDROCHLORIDE 10 MILLILITER(S): 20 JELLY TOPICAL at 09:15

## 2025-05-22 RX ADMIN — QUETIAPINE FUMARATE 100 MILLIGRAM(S): 25 TABLET ORAL at 21:42

## 2025-05-22 RX ADMIN — TRIAMCINOLONE ACETONIDE 1 APPLICATION(S): 1 CREAM TOPICAL at 21:42

## 2025-05-22 RX ADMIN — TRIAMCINOLONE ACETONIDE 1 APPLICATION(S): 1 CREAM TOPICAL at 09:15

## 2025-05-22 RX ADMIN — NICOTINE POLACRILEX 2 MILLIGRAM(S): 4 GUM, CHEWING ORAL at 20:23

## 2025-05-22 RX ADMIN — LIDOCAINE HYDROCHLORIDE 10 MILLILITER(S): 20 JELLY TOPICAL at 12:21

## 2025-05-22 RX ADMIN — NICOTINE POLACRILEX 2 MILLIGRAM(S): 4 GUM, CHEWING ORAL at 12:21

## 2025-05-22 RX ADMIN — LIDOCAINE HYDROCHLORIDE 10 MILLILITER(S): 20 JELLY TOPICAL at 21:41

## 2025-05-22 RX ADMIN — LITHIUM CARBONATE 150 MILLIGRAM(S): 600 CAPSULE, GELATIN COATED ORAL at 09:15

## 2025-05-22 RX ADMIN — LITHIUM CARBONATE 600 MILLIGRAM(S): 600 CAPSULE, GELATIN COATED ORAL at 21:42

## 2025-05-22 RX ADMIN — Medication 2 TABLET(S): at 21:42

## 2025-05-22 RX ADMIN — Medication 1 MILLIGRAM(S): at 23:46

## 2025-05-22 NOTE — BH INPATIENT PSYCHIATRY PROGRESS NOTE - OTHER
speech  slowly becoming better modulated in rate, volume and productivity becoming more linear becoming more euthymic thinking becoming more reality based normalizing very social but becoming better able to establish and to maintain boundaries improving  cooperative in a gradually more euthymic clinical state affect becoming more appropriate in range and intensity "I feel good now."

## 2025-05-23 PROCEDURE — 99232 SBSQ HOSP IP/OBS MODERATE 35: CPT

## 2025-05-23 RX ADMIN — LITHIUM CARBONATE 150 MILLIGRAM(S): 600 CAPSULE, GELATIN COATED ORAL at 09:35

## 2025-05-23 RX ADMIN — TRIAMCINOLONE ACETONIDE 1 APPLICATION(S): 1 CREAM TOPICAL at 09:35

## 2025-05-23 RX ADMIN — LIDOCAINE HYDROCHLORIDE 10 MILLILITER(S): 20 JELLY TOPICAL at 20:51

## 2025-05-23 RX ADMIN — LITHIUM CARBONATE 600 MILLIGRAM(S): 600 CAPSULE, GELATIN COATED ORAL at 20:52

## 2025-05-23 RX ADMIN — NICOTINE POLACRILEX 2 MILLIGRAM(S): 4 GUM, CHEWING ORAL at 12:40

## 2025-05-23 RX ADMIN — QUETIAPINE FUMARATE 100 MILLIGRAM(S): 25 TABLET ORAL at 20:52

## 2025-05-23 RX ADMIN — LIDOCAINE HYDROCHLORIDE 10 MILLILITER(S): 20 JELLY TOPICAL at 12:40

## 2025-05-23 RX ADMIN — LIDOCAINE HYDROCHLORIDE 10 MILLILITER(S): 20 JELLY TOPICAL at 09:35

## 2025-05-23 RX ADMIN — Medication 2 TABLET(S): at 20:52

## 2025-05-23 RX ADMIN — NICOTINE POLACRILEX 2 MILLIGRAM(S): 4 GUM, CHEWING ORAL at 09:38

## 2025-05-23 RX ADMIN — TRIAMCINOLONE ACETONIDE 1 APPLICATION(S): 1 CREAM TOPICAL at 20:51

## 2025-05-23 RX ADMIN — NICOTINE POLACRILEX 2 MILLIGRAM(S): 4 GUM, CHEWING ORAL at 15:49

## 2025-05-23 RX ADMIN — Medication 50 MILLIGRAM(S): at 23:09

## 2025-05-23 NOTE — BH DISCHARGE NOTE NURSING/SOCIAL WORK/PSYCH REHAB - NSDCPRGOAL_PSY_ALL_CORE
Pt attended group sessions on the unit. He was able to identify healthy coping skills to manage symptoms

## 2025-05-23 NOTE — BH DISCHARGE NOTE NURSING/SOCIAL WORK/PSYCH REHAB - NSDCPEEMAIL_GEN_ALL_CORE
[facility] for Tobacco Control email [email or web address]

## 2025-05-23 NOTE — BH DISCHARGE NOTE NURSING/SOCIAL WORK/PSYCH REHAB - NSDCADDINFO2FT_PSY_ALL_CORE
You have an in person appt with PANTERA Finnegan on 5/29 @ 12:30pm to continue your mental health treatment. Please address any substance abuse issues with your provider.

## 2025-05-23 NOTE — BH INPATIENT PSYCHIATRY PROGRESS NOTE - OTHER
cooperative in a more euthymic clinical state thinking becoming more reality based speech  slowly becoming better modulated in rate, volume and productivity "I feel good now." affect becoming more appropriate in range and intensity improving becoming more euthymic normalizing becoming more linear very social but becoming better able to establish and to maintain boundaries

## 2025-05-23 NOTE — BH DISCHARGE NOTE NURSING/SOCIAL WORK/PSYCH REHAB - NSDCPEWEB_GEN_ALL_CORE
[facility] for Tobacco Control website --- [email or web address] website --- [email or web address]

## 2025-05-23 NOTE — BH DISCHARGE NOTE NURSING/SOCIAL WORK/PSYCH REHAB - NSDCPLANREVIEWED_PSY_ALL_CORE
Patient alert, oriented x3, pleasant and cooperative.  Pt denies S/H IIP currently. MD, nurse and  reviewed discharge plan with patient who agrees and accepts plan.  Pt provided with a copy of discharge paperwork and agrees to receive a copy of the discharge note in the patient portal.  Pt provided with a copy of discharge paperwork.  Pt appropriately dressed for discharge and is transported home by family, friend or taxi to ensure safe arrival home.

## 2025-05-23 NOTE — BH DISCHARGE NOTE NURSING/SOCIAL WORK/PSYCH REHAB - NSDCOTHERNAME_GEN_ALL_CORE
[facility] [place] (*has virtual groups and for family members also)  (439) 484-[facility] (8951)  https://www.[employer]/groups    [facility] ([facility] [place] [facility]) support groups  5-387-293-SHAYNE (7199) or info@shayne.org

## 2025-05-23 NOTE — BH DISCHARGE NOTE NURSING/SOCIAL WORK/PSYCH REHAB - NSCDUDCCRISIS_PSY_A_CORE
Substance use resource  National Help Line for Substance Abuse  (699) 686-4881    SMART Recovery Groups  *Can meet online   https://www.smartrecovery.org     Find a local AA (or NA) group:  [place] [facility] 24hr hotline: (602) 115.5314  [place] [facility] 24hr hotline: 263.265.8956/[facility] Well  1 (230) [facility]-WELL (610-1648)  Text "WELL" to 42899  Website: www.[employer]/.[employer] [facility] 1 (924) 621-HOPE (7662) Website: www.safehorizon.org/.  [facility] – Crisis Care for Children, Adults and Families  [address] [place] [address]  Mobile Crisis Hotline – (536) 414-3241/.National Suicide Prevention Lifeline 7 (992) 001-2259/.  [employer]  1 (194) LIFENET (166-3875)/.  [facility]  (131) 875-2893/.  [facility] Behavioral Health Helpline / [facility] Mobile Crisis  (432) 227-GBOM (4781)/.  [facility] Response Crisis Hotline  (427) 978-2063  24 hour telephone crisis intervention and suicide prevention hotline concerned with all mental health issues/.  [facility]  [address] [place] [address] (105) [phone]   Hours:  Monday through Friday from 9 AM to 3 PM/Other.../988 Suicide and Crisis Lifeline

## 2025-05-23 NOTE — BH DISCHARGE NOTE NURSING/SOCIAL WORK/PSYCH REHAB - FINANCIAL ASSISTANCE
[facility] provides services at a reduced cost to those who are determined to be eligible through [facility]’s financial assistance program. Information regarding [facility]’s financial assistance program can be found by going to [email or web address] or by calling 1(109) 603-7033.

## 2025-05-23 NOTE — BH DISCHARGE NOTE NURSING/SOCIAL WORK/PSYCH REHAB - PATIENT PORTAL LINK FT
You can access the FollowMyHealth Patient Portal offered by [facility] by registering at the following website: [email or web address]. By joining [email or web address]’s FollowMyHealth portal, you will also be able to view your health information using other applications (apps) compatible with our system.

## 2025-05-23 NOTE — BH DISCHARGE NOTE NURSING/SOCIAL WORK/PSYCH REHAB - NSDCADDINFO1FT_PSY_ALL_CORE
You will return to your therapist, Angel Zafar LCSW, on 5/28 @ 4pm to continue your mental health treatment. Please address any substance abuse issues with your provider.

## 2025-05-23 NOTE — BH DISCHARGE NOTE NURSING/SOCIAL WORK/PSYCH REHAB - NSBHCRISISRESOURCESOTHER_PSY_ALL_CORE_FT
Please contact Dr. Ely for medication or treatment questions, lab results or any other concerns at [phone]. Handoff provided to your outpatient provider, Angel Zafar LCSW.  If needed, please contact [facility], [facility], 24/7 days a week and speak with Joanie Tamez RN Nurse manager or any [facility] staff member @ [phone].  Please return to the ED if symptoms worsen or return.

## 2025-05-23 NOTE — BH INPATIENT PSYCHIATRY PROGRESS NOTE - NSBHMSESPABN_PSY_A_CORE
Pressured rate/Increased productivity
Increased productivity/Other
Increased productivity/Other
Pressured rate/Increased productivity
Pressured rate/Increased productivity
Increased productivity/Other
Increased productivity/Other
Pressured rate/Increased productivity
Pressured rate/Increased productivity
Increased productivity/Other

## 2025-05-24 RX ADMIN — NICOTINE POLACRILEX 2 MILLIGRAM(S): 4 GUM, CHEWING ORAL at 09:11

## 2025-05-24 RX ADMIN — LITHIUM CARBONATE 600 MILLIGRAM(S): 600 CAPSULE, GELATIN COATED ORAL at 23:20

## 2025-05-24 RX ADMIN — TRIAMCINOLONE ACETONIDE 1 APPLICATION(S): 1 CREAM TOPICAL at 23:20

## 2025-05-24 RX ADMIN — Medication 1 MILLIGRAM(S): at 00:12

## 2025-05-24 RX ADMIN — LIDOCAINE HYDROCHLORIDE 10 MILLILITER(S): 20 JELLY TOPICAL at 12:22

## 2025-05-24 RX ADMIN — Medication 2 TABLET(S): at 23:21

## 2025-05-24 RX ADMIN — NICOTINE POLACRILEX 2 MILLIGRAM(S): 4 GUM, CHEWING ORAL at 17:55

## 2025-05-24 RX ADMIN — LIDOCAINE HYDROCHLORIDE 10 MILLILITER(S): 20 JELLY TOPICAL at 23:19

## 2025-05-24 RX ADMIN — Medication 50 MILLIGRAM(S): at 23:21

## 2025-05-24 RX ADMIN — NICOTINE POLACRILEX 2 MILLIGRAM(S): 4 GUM, CHEWING ORAL at 12:23

## 2025-05-24 RX ADMIN — LIDOCAINE HYDROCHLORIDE 10 MILLILITER(S): 20 JELLY TOPICAL at 09:10

## 2025-05-24 RX ADMIN — LITHIUM CARBONATE 150 MILLIGRAM(S): 600 CAPSULE, GELATIN COATED ORAL at 09:10

## 2025-05-24 RX ADMIN — QUETIAPINE FUMARATE 100 MILLIGRAM(S): 25 TABLET ORAL at 23:21

## 2025-05-24 RX ADMIN — Medication 1 MILLIGRAM(S): at 17:55

## 2025-05-24 RX ADMIN — TRIAMCINOLONE ACETONIDE 1 APPLICATION(S): 1 CREAM TOPICAL at 09:10

## 2025-05-25 RX ADMIN — LIDOCAINE HYDROCHLORIDE 10 MILLILITER(S): 20 JELLY TOPICAL at 12:50

## 2025-05-25 RX ADMIN — Medication 2 TABLET(S): at 21:39

## 2025-05-25 RX ADMIN — LITHIUM CARBONATE 600 MILLIGRAM(S): 600 CAPSULE, GELATIN COATED ORAL at 21:39

## 2025-05-25 RX ADMIN — NICOTINE POLACRILEX 2 MILLIGRAM(S): 4 GUM, CHEWING ORAL at 21:40

## 2025-05-25 RX ADMIN — LITHIUM CARBONATE 150 MILLIGRAM(S): 600 CAPSULE, GELATIN COATED ORAL at 11:24

## 2025-05-25 RX ADMIN — QUETIAPINE FUMARATE 100 MILLIGRAM(S): 25 TABLET ORAL at 21:39

## 2025-05-25 RX ADMIN — LIDOCAINE HYDROCHLORIDE 10 MILLILITER(S): 20 JELLY TOPICAL at 21:39

## 2025-05-25 RX ADMIN — Medication 1 MILLIGRAM(S): at 23:15

## 2025-05-25 RX ADMIN — TRIAMCINOLONE ACETONIDE 1 APPLICATION(S): 1 CREAM TOPICAL at 21:39

## 2025-05-25 RX ADMIN — LIDOCAINE HYDROCHLORIDE 10 MILLILITER(S): 20 JELLY TOPICAL at 11:24

## 2025-05-25 RX ADMIN — Medication 1 MILLIGRAM(S): at 00:23

## 2025-05-25 RX ADMIN — TRIAMCINOLONE ACETONIDE 1 APPLICATION(S): 1 CREAM TOPICAL at 11:24

## 2025-05-26 RX ADMIN — LITHIUM CARBONATE 150 MILLIGRAM(S): 600 CAPSULE, GELATIN COATED ORAL at 09:12

## 2025-05-26 RX ADMIN — QUETIAPINE FUMARATE 100 MILLIGRAM(S): 25 TABLET ORAL at 21:56

## 2025-05-26 RX ADMIN — LIDOCAINE HYDROCHLORIDE 10 MILLILITER(S): 20 JELLY TOPICAL at 09:12

## 2025-05-26 RX ADMIN — TRIAMCINOLONE ACETONIDE 1 APPLICATION(S): 1 CREAM TOPICAL at 09:12

## 2025-05-26 RX ADMIN — LIDOCAINE HYDROCHLORIDE 10 MILLILITER(S): 20 JELLY TOPICAL at 21:58

## 2025-05-26 RX ADMIN — TRIAMCINOLONE ACETONIDE 1 APPLICATION(S): 1 CREAM TOPICAL at 21:57

## 2025-05-26 RX ADMIN — NICOTINE POLACRILEX 2 MILLIGRAM(S): 4 GUM, CHEWING ORAL at 09:15

## 2025-05-26 RX ADMIN — LITHIUM CARBONATE 600 MILLIGRAM(S): 600 CAPSULE, GELATIN COATED ORAL at 21:56

## 2025-05-26 RX ADMIN — Medication 2 TABLET(S): at 21:56

## 2025-05-27 VITALS — RESPIRATION RATE: 16 BRPM | OXYGEN SATURATION: 100 % | TEMPERATURE: 98 F

## 2025-05-27 LAB
ALBUMIN SERPL ELPH-MCNC: 3.8 G/DL — SIGNIFICANT CHANGE UP (ref 3.3–5)
ALP SERPL-CCNC: 74 U/L — SIGNIFICANT CHANGE UP (ref 40–120)
ALT FLD-CCNC: 29 U/L — SIGNIFICANT CHANGE UP (ref 12–78)
ANION GAP SERPL CALC-SCNC: 0 MMOL/L — LOW (ref 5–17)
AST SERPL-CCNC: 16 U/L — SIGNIFICANT CHANGE UP (ref 15–37)
BILIRUB SERPL-MCNC: 0.5 MG/DL — SIGNIFICANT CHANGE UP (ref 0.2–1.2)
BUN SERPL-MCNC: 15 MG/DL — SIGNIFICANT CHANGE UP (ref 7–23)
CALCIUM SERPL-MCNC: 9.8 MG/DL — SIGNIFICANT CHANGE UP (ref 8.5–10.1)
CHLORIDE SERPL-SCNC: 104 MMOL/L — SIGNIFICANT CHANGE UP (ref 96–108)
CO2 SERPL-SCNC: 32 MMOL/L — HIGH (ref 22–31)
CREAT SERPL-MCNC: 1.11 MG/DL — SIGNIFICANT CHANGE UP (ref 0.5–1.3)
EGFR: 80 ML/MIN/1.73M2 — SIGNIFICANT CHANGE UP
EGFR: 80 ML/MIN/1.73M2 — SIGNIFICANT CHANGE UP
GLUCOSE SERPL-MCNC: 91 MG/DL — SIGNIFICANT CHANGE UP (ref 70–99)
LITHIUM SERPL-MCNC: 0.6 MMOL/L — SIGNIFICANT CHANGE UP (ref 0.6–1.2)
POTASSIUM SERPL-MCNC: 4.2 MMOL/L — SIGNIFICANT CHANGE UP (ref 3.5–5.3)
POTASSIUM SERPL-SCNC: 4.2 MMOL/L — SIGNIFICANT CHANGE UP (ref 3.5–5.3)
PROT SERPL-MCNC: 6.8 GM/DL — SIGNIFICANT CHANGE UP (ref 6–8.3)
SODIUM SERPL-SCNC: 136 MMOL/L — SIGNIFICANT CHANGE UP (ref 135–145)

## 2025-05-27 PROCEDURE — 99239 HOSP IP/OBS DSCHRG MGMT >30: CPT

## 2025-05-27 RX ORDER — SENNA 187 MG
2 TABLET ORAL
Qty: 60 | Refills: 0
Start: 2025-05-27 | End: 2025-06-25

## 2025-05-27 RX ORDER — TRIAMCINOLONE ACETONIDE 1 MG/G
1 CREAM TOPICAL
Qty: 0 | Refills: 0 | DISCHARGE
Start: 2025-05-27

## 2025-05-27 RX ORDER — LITHIUM CARBONATE 600 MG/1
1 CAPSULE, GELATIN COATED ORAL
Qty: 30 | Refills: 0
Start: 2025-05-27 | End: 2025-06-25

## 2025-05-27 RX ORDER — QUETIAPINE FUMARATE 25 MG/1
1 TABLET ORAL
Qty: 30 | Refills: 0
Start: 2025-05-27 | End: 2025-06-25

## 2025-05-27 RX ADMIN — LITHIUM CARBONATE 150 MILLIGRAM(S): 600 CAPSULE, GELATIN COATED ORAL at 09:56

## 2025-05-27 RX ADMIN — Medication 1 MILLIGRAM(S): at 01:02

## 2025-05-27 RX ADMIN — Medication 650 MILLIGRAM(S): at 12:43

## 2025-05-27 RX ADMIN — NICOTINE POLACRILEX 2 MILLIGRAM(S): 4 GUM, CHEWING ORAL at 09:57

## 2025-05-27 NOTE — BH INPATIENT PSYCHIATRY PROGRESS NOTE - NSICDXBHPRIMARYDX_PSY_ALL_CORE
Severe bipolar affective disorder with psychosis   F31.89  

## 2025-05-27 NOTE — BH INPATIENT PSYCHIATRY PROGRESS NOTE - NSBHASSESSSUMMFT_PSY_ALL_CORE
pt denies any suicidal ideation intent or plan   mitigating pt with medication   protective pt with family and place to live   chronic pt with prior psych hx  
pt denies any suicidal ideation intent or plan   mitigating pt with medication   protective pt with family and place to live   chronic pt with prior psych hx    5/20/25 Pt seen and discussed with treatment team. The pt continues to slowly clinically improve with a gradual decrease in the overall clinical symptoms of his bipolar d/o since the pt began Lithium now at 600 mg po qhs after years of having self discontinued this key evidence based treatment for bipolar 1 disorder. The pt reported improved sleep and good appetite with well tolerated Lithium and Seroquel 100 mg po qhs  without reported adverse effects.  The pt appears with an overall  calmer demeanor with good eye contact and with  speech better modulated in rate, volume and productivity. The pt , once again has now withdrawn his verbal consent for hospital staff to contact his parents though he has spoken with them on the phone. The pt reported his understanding of their concern for his health and he shares their love of one another, but he spoke of being able to care foe himself independently  at this juncture in his life. The pt has been attending therapy groups but noted that other peers were leaving while he remained in hospital. The pt's nino has been visiting daily and she continues to be a major support for the pt at this time.  The pt denies any current SI / HI /AH and he currently presents as a low risk for either suicide or for impulsive aggression.  The pt's judgment and insight continue to improve though they remain somewhat impaired.   5/21/25 Pt seen and discussed with treatment team. The pt continues to clinically improve and he and his immediate family remain discharge focussed. The pt is tolerating his current med regimen of Lithium 600 mg po qhs and Seroquel 100 mg po qhs with ongoing clinical alleviation of the pt's bipolar affective d/o with psychosis. The pt reported good sleep and appetite and he has been attending therapy groups and participating meaningfully. The pt denies current SI / HI /AH and he presents currently as a low risk for either suicide or for impulsive aggression. The pt's judgment is improving though his insight remains somewhat limited.  5/22/25 Pt seen  and discussed with treatment team and earlier with the pt's nino with pt consent.  The pt continues to clinically improve with more stable mood and with more linear and organized thinking. The pt has decided to rinse off markers to his forearm indicating the days he has been in hospital. The pt visited briefly with his parents and the visit went alright. The pt is tolerating his current med regimen without any adverse effects and with good clinical effect in stabilizing his primary bipolar affective disorder. The pt denies any current SI /HI / AH  . 
pt denies any suicidal ideation intent or plan   mitigating pt with medication   protective pt with family and place to live   chronic pt with prior psych hx    5/20/25 Pt seen and discussed with treatment team. The pt continues to slowly clinically improve with a gradual decrease in the overall clinical symptoms of his bipolar d/o since the pt began Lithium now at 600 mg po qhs after years of having self discontinued this key evidence based treatment for bipolar 1 disorder. The pt reported improved sleep and good appetite with well tolerated Lithium and Seroquel 100 mg po qhs  without reported adverse effects.  The pt appears with an overall  calmer demeanor with good eye contact and with  speech better modulated in rate, volume and productivity. The pt , once again has now withdrawn his verbal consent for hospital staff to contact his parents though he has spoken with them on the phone. The pt reported his understanding of their concern for his health and he shares their love of one another, but he spoke of being able to care foe himself independently  at this juncture in his life. The pt has been attending therapy groups but noted that other peers were leaving while he remained in hospital. The pt's antonye has been visiting daily and she continues to be a major support for the pt at this time.  The pt denies any current SI / HI /AH and he currently presents as a low risk for either suicide or for impulsive aggression.  The pt's judgment and insight continue to improve though they remain somewhat impaired.   5/21/25 Pt seen and discussed with treatment team. The pt continues to clinically improve and he and his immediate family remain discharge focussed. The pt is tolerating his current med regimen of Lithium 600 mg po qhs and Seroquel 100 mg po qhs with ongoing clinical alleviation of the pt's bipolar affective d/o with psychosis. The pt reported good sleep and appetite and he has been attending therapy groups and participating meaningfully. The pt denies current SI / HI /AH and he presents currently as a low risk for either suicide or for impulsive aggression. The pt's judgment is improving though his insight remains somewhat limited. 
The pt is a 49 yr-old WM diagnosed with juvenile onset bipolar d/o at age 13 , hospitalized once at that time and reportedly successfully treated with Lithium. 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 The pt's parents live nearby and the pt's 16 year-old daughter will be visiting colleges as she plans ahead after high school.     The pt , recently reported by friends and other concerned loved ones, reported safety issues related to what appeared to be a  pt clinical relapse in the context of medication nonadherence and comorbid THC and ETOH use  with resultant pt worsening bipolar manic psychosis  such that the pt was initially brought [facility] after the pt had been acting erratically and reportedly irrationally in [facility].  This writer had frequent phone contact with the ED doctor at [facility] who had begun the pt on Abilify to treat the pt's affective psychosis with grandiose , paranoid, and Confucianism delusional thinking  associated with decreased need for sleep  and flight of ideas with grandiose , well meaning but risky plans the pt wished to implement. The pt had declined Lithium or Depakote restart  to assist with his severe and functionally impairing bipolar d/o and he remained in the Emergency Observation Unit  at [facility] from 5/10/25 until his transfer on 5/13/25 on a 9.27 2 PC legal status to his home town family location  in [place] , to  [facility] for admission to  [facility] inpatient psychiatry for pt safety and for ongoing pt evaluation and treatment of his bipolar d/o.       When seen today on  by this writer, the pt presented as cordial with speech increased in rate and productivity with a rapid succession of grandiose  and possibly injudicious plans for himself, his family and those around him. The pt tended to dismiss the safety concerns of all those around him , the pt's insisting that they were overreacting and had no reason to worry. The pt agreed to continue with newly begun Abilify to be increased to 15 mg po q am while the pt continues to decline a restart of likely potentially helpful Lithium to treat his bipolar d/o. The pt denied any current SI /HI /AH and he presented as graciously glib and carefree. The pt's judgment appeared impaired with limited insight at this time. 
pt denies any suicidal ideation intent or plan   mitigating pt with medication   protective pt with family and place to live   chronic pt with prior psych hx    5/20/25 Pt seen and discussed with treatment team. The pt continues to slowly clinically improve with a gradual decrease in the overall clinical symptoms of his bipolar d/o since the pt began Lithium now at 600 mg po qhs after years of having self discontinued this key evidence based treatment for bipolar 1 disorder. The pt reported improved sleep and good appetite with well tolerated Lithium and Seroquel 100 mg po qhs  without reported adverse effects.  The pt appears with an overall  calmer demeanor with good eye contact and with  speech better modulated in rate, volume and productivity. The pt , once again has now withdrawn his verbal consent for hospital staff to contact his parents though he has spoken with them on the phone. The pt reported his understanding of their concern for his health and he shares their love of one another, but he spoke of being able to care foe himself independently  at this juncture in his life. The pt has been attending therapy groups but noted that other peers were leaving while he remained in hospital. The pt's nino has been visiting daily and she continues to be a major support for the pt at this time.  The pt denies any current SI / HI /AH and he currently presents as a low risk for either suicide or for impulsive aggression.  The pt's judgment and insight continue to improve though they remain somewhat impaired.   5/21/25 Pt seen and discussed with treatment team. The pt continues to clinically improve and he and his immediate family remain discharge focussed. The pt is tolerating his current med regimen of Lithium 600 mg po qhs and Seroquel 100 mg po qhs with ongoing clinical alleviation of the pt's bipolar affective d/o with psychosis. The pt reported good sleep and appetite and he has been attending therapy groups and participating meaningfully. The pt denies current SI / HI /AH and he presents currently as a low risk for either suicide or for impulsive aggression. The pt's judgment is improving though his insight remains somewhat limited.  5/22/25 Pt seen  and discussed with treatment team and earlier with the pt's nino with pt consent.  The pt continues to clinically improve with more stable mood and with more linear and organized thinking. The pt has decided to rinse off markers to his forearm indicating the days he has been in hospital. The pt visited briefly with his parents and the visit went alright. The pt is tolerating his current med regimen without any adverse effects and with good clinical effect in stabilizing his primary bipolar affective disorder. The pt denies any current SI /HI / AH  .   5/23/25 Pt seen and discussed with treatment team and with the pt's fiancee with pt consent.  The pt expressed increasing awareness as to his ability to maintain his composure and to be better focussed with more appropriate boundaries , with pt better ability he noted to maintain his composure even when interacting with potentially " triggering" friends and family.  The pt reported good sleep and appetite and he is clinically improving with effective med regimen of Lithium 150 mg po q am and 600 mg po qhs and Seroquel 100 mg po qhs to treat the pt's bipolar affective psychosis.  Writer again spoke with the pt as to the recommendation for the pt to remain on his current med regimen and to not make any unilateral med changes without discussing this with his outpatient new med management treatment team.  The pt was amenable to this plan and has great support from friends and family. The pt denies any current SI / HI /AH and he remains a low current risk for either suicide or for impulsive aggression.   5/27/25 Pt seen and discussed  with treatment team prior to pt DC home from hospital on 5/27/25. The pt reported and staff have noted pt ongoing clinical improvement with continued mood stabilization, good sleep and appetite and more linear  and coherent organized thinking and goal directed speech. The pt continues to tolerate his current med regimen of Lithium 150 mg po q am and 600 mg po qhs along with Seroquel 100 mg po qhs  with good clinical efficacy and tolerated without any reported adverse effects. The pt has been visible on the unit and has been able to be meaningfully interactive with peers and staff with therapy group participation.  The pt remains future oriented and he continues to deny any current SI /HI /AH /VH. The pt was given more psychoeducational information related to his current med regimen and the importance of sleep hygiene, med compliance and with the ongoing recommendation that the pt limit / abstain from use of THC or ETOH in light of the potential for exacerbation of the pt's mood d/o.        Clinical handoff has been completed by hospital staff and the pt's medications have been e-prescribed to his [facility] in [place]  x a 30 day med supply with no refill. The pt has a follow up outpatient therapy appointment on 5/28/25 at 4 pm with  Angel Zafar LCSW . The pt also has a new outpatient med management appointment with [facility]  Psychiatry in person on 5/29/25 at 12:30 pm  with PANTERA Finnegan for ongoing med management of the pt's bipolar d/o with Lithium and Seroquel as above. The pt is aware that he can contact Dr Ely at [facility] at tel [phone] with any further questions or concerns. 
The pt is a 49 yr-old WM diagnosed with juvenile onset bipolar d/o at age 13 , hospitalized once at that time and reportedly successfully treated with Lithium. 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 The pt's parents live nearby and the pt's 16 year-old daughter will be visiting colleges as she plans ahead after high school.     The pt , recently reported by friends and other concerned loved ones, reported safety issues related to what appeared to be a  pt clinical relapse in the context of medication nonadherence and comorbid THC and ETOH use  with resultant pt worsening bipolar manic psychosis  such that the pt was initially brought [facility] after the pt had been acting erratically and reportedly irrationally in [facility].  This writer had frequent phone contact with the ED doctor at [facility] who had begun the pt on Abilify to treat the pt's affective psychosis with grandiose , paranoid, and Orthodox delusional thinking  associated with decreased need for sleep  and flight of ideas with grandiose , well meaning but risky plans the pt wished to implement. The pt had declined Lithium or Depakote restart  to assist with his severe and functionally impairing bipolar d/o and he remained in the Emergency Observation Unit  at [facility] from 5/10/25 until his transfer on 5/13/25 on a 9.27 2 PC legal status to his home town family location  in [place] , to  [facility] for admission to  [facility] inpatient psychiatry for pt safety and for ongoing pt evaluation and treatment of his bipolar d/o.       When seen today on  by this writer, the pt presented as cordial with speech increased in rate and productivity with a rapid succession of grandiose  and possibly injudicious plans for himself, his family and those around him. The pt tended to dismiss the safety concerns of all those around him , the pt's insisting that they were overreacting and had no reason to worry. The pt agreed to continue with newly begun Abilify to be increased to 15 mg po q am while the pt continues to decline a restart of likely potentially helpful Lithium to treat his bipolar d/o. The pt denied any current SI /HI /AH and he presented as graciously glib and carefree. The pt's judgment appeared impaired with limited insight at this time.   5/15/25 Pt seen and discussed with treatment team. The pt remains anxious and affectively labile with sudden tearfulness and distress early in the morning. In the afternoon, the pt appeared somewhat dysphoric despite efforts to minimize his distress in the context of pt. false bravado. The pt spoke of fragmented sleep due to roommate issues but his appetite is good and he is more amenable to allow for a restart of his previously clinically very effective med regimen of Lithium. This writer reviewed with the pt the risks and benefits of Lithium ( e.g. the salt quality of increasing thirst  and subsequent urination , GI upset possible on an empty stomach, thyroid and renal function issues and reason for lab work. )  Plan agreed to by the pt to increase Lithium dose to 600 mg po qhs with DC of low dose am dose due to possible pt sedation. Also discussed plan for increase in pt's hs Seroquel dose to 100 mg po qhs to further aid in organizing the pt's thinking. Risks related to second generation antipsychotic med like Seroquel ( elevated lipids, blood glucose due to possible increased appetite discussed) along with pt plan in place to monitor healthy food intake and to increase exercise now and after DC home. The pt's family have continued to be a major pt support and the pt's fiancee has now cancelled recent trips the pt had scheduled when manic. The pt currently denies any active SI /HI /AH  and he remains future oriented  and hopeful.   5/16/25 Pt seen and discussed with treatment team. The pt continues to slowly clinically improve as his mood lability and thought disorder continue to slowly jose with newly begun and slowly titrated pt med regimen of Seroquel 100 mg po qhs and Lithium 600 mg po qhs  to treat the pt's severe and functionally impairing with reported juvenile onset  with a long hiatus of relative mood stability.  The pt remains with hypomanic rapid and overinclusive speech but with less frequent flight of ideas and no overt delusions though the pt presents with some ongoing anxiety and gradually decreasing suspiciousness. ( e.g. the pt had mistakenly believed that staff  at both [facility] and here on [facility] were 'deliberately ' trying to limit his access to H20 when this was clearly not the case. )   The pt is tolerating his current med regimen w/o adverse effects but with clinical efficacy still too early to fully gauge. Plan to check a Lithium level and CMP next week and to continue to monitor the pt's clinical status and ability to tolerate his current med regimen. The pt had been dehydrated upon initial admission to [facility] on 5/10/25 due to his manic psychosis and lack of self care including adequate nutrition.  The pt is beginning to sleep and to eat more consistently . Writer spoke with the pt as to an overview of Lithium treatment and potential side effects including thirst as Lithium is a salt.  Pt with a h/o ringworm to neck and back noted by famil member. Plan to restart pt Flagyl treatment. The pt denies any current SI /HI /AH . 
pt denies any suicidal ideation intent or plan   mitigating pt with medication   protective pt with family and place to live   chronic pt with prior psych hx    5/20/25 Pt seen and discussed with treatment team. The pt continues to slowly clinically improve with a gradual decrease in the overall clinical symptoms of his bipolar d/o since the pt began Lithium now at 600 mg po qhs after years of having self discontinued this key evidence based treatment for bipolar 1 disorder. The pt reported improved sleep and good appetite with well tolerated Lithium and Seroquel 100 mg po qhs  without reported adverse effects.  The pt appears with an overall  calmer demeanor with good eye contact and with  speech better modulated in rate, volume and productivity. The pt , once again has now withdrawn his verbal consent for hospital staff to contact his parents though he has spoken with them on the phone. The pt reported his understanding of their concern for his health and he shares their love of one another, but he spoke of being able to care foe himself independently  at this juncture in his life. The pt has been attending therapy groups but noted that other peers were leaving while he remained in hospital. The pt's nino has been visiting daily and she continues to be a major support for the pt at this time.  The pt denies any current SI / HI /AH and he currently presents as a low risk for either suicide or for impulsive aggression.  The pt's judgment and insight continue to improve though they remain somewhat impaired. 
The pt is a 49 yr-old WM diagnosed with juvenile onset bipolar d/o at age 13 , hospitalized once at that time and reportedly successfully treated with Lithium. 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 The pt's parents live nearby and the pt's 16 year-old daughter will be visiting colleges as she plans ahead after high school.     The pt , recently reported by friends and other concerned loved ones, reported safety issues related to what appeared to be a  pt clinical relapse in the context of medication nonadherence and comorbid THC and ETOH use  with resultant pt worsening bipolar manic psychosis  such that the pt was initially brought [facility][1] after the pt had been acting erratically and reportedly irrationally in [facility].  This writer had frequent phone contact with the ED doctor at [facility][1] who had begun the pt on Abilify to treat the pt's affective psychosis with grandiose , paranoid, and Restoration delusional thinking  associated with decreased need for sleep  and flight of ideas with grandiose , well meaning but risky plans the pt wished to implement. The pt had declined Lithium or Depakote restart  to assist with his severe and functionally impairing bipolar d/o and he remained in the Emergency Observation Unit  at [facility][1] from 5/10/25 until his transfer on 5/13/25 on a 9.27 2 PC legal status to his home town family location  in [place] , to  [facility] for admission to  [facility] inpatient psychiatry for pt safety and for ongoing pt evaluation and treatment of his bipolar d/o.       When seen today on  by this writer, the pt presented as cordial with speech increased in rate and productivity with a rapid succession of grandiose  and possibly injudicious plans for himself, his family and those around him. The pt tended to dismiss the safety concerns of all those around him , the pt's insisting that they were overreacting and had no reason to worry. The pt agreed to continue with newly begun Abilify to be increased to 15 mg po q am while the pt continues to decline a restart of likely potentially helpful Lithium to treat his bipolar d/o. The pt denied any current SI /HI /AH and he presented as graciously glib and carefree. The pt's judgment appeared impaired with limited insight at this time.   5/15/25 Pt seen and discussed with treatment team. The pt remains anxious and affectively labile with sudden tearfulness and distress early in the morning. In the afternoon, the pt appeared somewhat dysphoric despite efforts to minimize his distress in the context of pt. false bravado. The pt spoke of fragmented sleep due to roommate issues but his appetite is good and he is more amenable to allow for a restart of his previously clinically very effective med regimen of Lithium. This writer reviewed with the pt the risks and benefits of Lithium ( e.g. the salt quality of increasing thirst  and subsequent urination , GI upset possible on an empty stomach, thyroid and renal function issues and reason for lab work. )  Plan agreed to by the pt to increase Lithium dose to 600 mg po qhs with DC of low dose am dose due to possible pt sedation. Also discussed plan for increase in pt's hs Seroquel dose to 100 mg po qhs to further aid in organizing the pt's thinking. Risks related to second generation antipsychotic med like Seroquel ( elevated lipids, blood glucose due to possible increased appetite discussed) along with pt plan in place to monitor healthy food intake and to increase exercise now and after DC home. The pt's family have continued to be a major pt support and the pt's fiancee has now cancelled recent trips the pt had scheduled when manic. The pt currently denies any active SI /HI /AH  and he remains future oriented  and hopeful. 
pt denies any suicidal ideation intent or plan   mitigating pt with medication   protective pt with family and place to live   chronic pt with prior psych hx  
pt denies any suicidal ideation intent or plan   mitigating pt on medication   protective pt with family and place to live   chronic pt with prior psych hx 
pt denies any suicidal ideation intent or plan   mitigating pt with medication   protective pt with family and place to live   chronic pt with prior psych hx    5/20/25 Pt seen and discussed with treatment team. The pt continues to slowly clinically improve with a gradual decrease in the overall clinical symptoms of his bipolar d/o since the pt began Lithium now at 600 mg po qhs after years of having self discontinued this key evidence based treatment for bipolar 1 disorder. The pt reported improved sleep and good appetite with well tolerated Lithium and Seroquel 100 mg po qhs  without reported adverse effects.  The pt appears with an overall  calmer demeanor with good eye contact and with  speech better modulated in rate, volume and productivity. The pt , once again has now withdrawn his verbal consent for hospital staff to contact his parents though he has spoken with them on the phone. The pt reported his understanding of their concern for his health and he shares their love of one another, but he spoke of being able to care foe himself independently  at this juncture in his life. The pt has been attending therapy groups but noted that other peers were leaving while he remained in hospital. The pt's nino has been visiting daily and she continues to be a major support for the pt at this time.  The pt denies any current SI / HI /AH and he currently presents as a low risk for either suicide or for impulsive aggression.  The pt's judgment and insight continue to improve though they remain somewhat impaired.   5/21/25 Pt seen and discussed with treatment team. The pt continues to clinically improve and he and his immediate family remain discharge focussed. The pt is tolerating his current med regimen of Lithium 600 mg po qhs and Seroquel 100 mg po qhs with ongoing clinical alleviation of the pt's bipolar affective d/o with psychosis. The pt reported good sleep and appetite and he has been attending therapy groups and participating meaningfully. The pt denies current SI / HI /AH and he presents currently as a low risk for either suicide or for impulsive aggression. The pt's judgment is improving though his insight remains somewhat limited.  5/22/25 Pt seen  and discussed with treatment team and earlier with the pt's nino with pt consent.  The pt continues to clinically improve with more stable mood and with more linear and organized thinking. The pt has decided to rinse off markers to his forearm indicating the days he has been in hospital. The pt visited briefly with his parents and the visit went alright. The pt is tolerating his current med regimen without any adverse effects and with good clinical effect in stabilizing his primary bipolar affective disorder. The pt denies any current SI /HI / AH  .   5/23/25 Pt seen and discussed with treatment team and with the pt's fiancee with pt consent.  The pt expressed increasing awareness as to his ability to maintain his composure and to be better focussed with more appropriate boundaries , with pt better ability he noted to maintain his composure even when interacting with potentially " triggering" friends and family.  The pt reported good sleep and appetite and he is clinically improving with effective med regimen of Lithium 150 mg po q am and 600 mg po qhs and Seroquel 100 mg po qhs to treat the pt's bipolar affective psychosis.  Writer again spoke with the pt as to the recommendation for the pt to remain on his current med regimen and to not make any unilateral med changes without discussing this with his outpatient new med management treatment team.  The pt was amenable to this plan and has great support from friends and family. The pt denies any current SI / HI /AH and he remains a low current risk for either suicide or for impulsive aggression.

## 2025-05-27 NOTE — BH INPATIENT PSYCHIATRY PROGRESS NOTE - NSBHMSETHTCONTENT_PSY_A_CORE
Delusions/Preoccupations/Ruminations
Delusions/Preoccupations/Ruminations
Other
Other
Delusions/Preoccupations/Ruminations
Delusions/Preoccupations/Ruminations
Delusions/Preoccupations/Ruminations/Other
Other
Other
Delusions/Preoccupations/Ruminations
Unremarkable/Other

## 2025-05-27 NOTE — BH INPATIENT PSYCHIATRY DISCHARGE NOTE - NSBHDCRISKMITIGATEFT_PSY_ALL_CORE
Clinical handoff has been completed by hospital staff and the pt's medications have been e-prescribed to his [facility] in [place]  x a 30 day med supply with no refill. The pt has a follow up outpatient therapy appointment on 5/28/25 at 4 pm with  Angel Zafar LCSW . The pt also has a new outpatient med management appointment with [facility]  Psychiatry in person on 5/29/25 at 12:30 pm  with PANTERA Finnegan for ongoing med management of the pt's bipolar d/o with Lithium and Seroquel as above. The pt is aware that he can contact Dr Ely at [facility] at tel [phone] with any further questions or concerns.

## 2025-05-27 NOTE — BH INPATIENT PSYCHIATRY PROGRESS NOTE - NSTXDISORGDATEEST_PSY_ALL_CORE
13-May-2025
25-May-2025
13-May-2025

## 2025-05-27 NOTE — BH INPATIENT PSYCHIATRY DISCHARGE NOTE - NSBHDCHANDOFFFT_PSY_ALL_CORE
Clinical handoff has been completed by hospital staff and the pt's medications have been e-prescribed to his [facility] in [place]  x a 30 day med supply with no refill. The pt has a follow up outpatient therapy appointment on 5/28/25 at 4 pm with  Angel Zafar LCSW . The pt also has a new outpatient med management appointment with [facility]  Psychiatry in person on 5/29/25 at 12:30 pm  with PANTERA Finnegan for ongoing med management of the pt's bipolar d/o with Lithium and Seroquel as above. The pt is aware that he can contact Dr Ely at [facility] at tel [phone] with any further questions or concerns.

## 2025-05-27 NOTE — BH INPATIENT PSYCHIATRY DISCHARGE NOTE - NSDCPROCEDURESFT_PSY_ALL_CORE
TSH = 2.91  HgA1C= 5.5  Fasting lipids  Cholesterol = 203  TG= 109  EKG  SARS CO V 2  / RSV / INFLUENZA ALL NOT DETECTED   5/27/25 Labs  Lithium level = 0.6 mmol/L   BUN / Creatinine / GFR = 15/1.11/ 80   No studies are pending TSH = 2.91  HgA1C= 5.5  Fasting lipids  Cholesterol = 203  TG= 109  EKG WNL   NSR  Urine tox screen + THC  BAL <10  SARS CO V 2  / RSV / INFLUENZA ALL NOT DETECTED   5/27/25 Labs  Lithium level = 0.6 mmol/L   BUN / Creatinine / GFR = 15/1.11/ 80   No studies are pending

## 2025-05-27 NOTE — BH INPATIENT PSYCHIATRY PROGRESS NOTE - NSBHMSETHTPROC_PSY_A_CORE
Date form completed: 5/24/24  Form sent via: Fax  Date faxed or mailed: 5/29/24  Fax # or Address: [phone]  Completed by: Won Schwartz MA    
Forms Request:  Today's Date: May 21, 2024   Form Type: Home Care Orders, Provider Orders for Life Sustaining Treatment - POLST (5/17/24)  Where is the form from: [facility]  How was form received: Fax  NORIS on file: NO   How is form being returned: Fax  Fax Number/Address: [phone]  PCP: Joanna Farah   Color Team: Yellow Team  Form Given to: Call Center    
Overinclusive/Circumstantial/Tangential/Flight of ideas/Impaired reasoning
Overinclusive/Circumstantial/Tangential/Illogical/Impaired reasoning
Linear/Other
Overinclusive/Circumstantial/Tangential/Flight of ideas/Impaired reasoning
Overinclusive/Circumstantial/Tangential/Flight of ideas/Impaired reasoning
Linear/Normal reasoning/Other
Linear/Other
Linear/Other
Overinclusive/Circumstantial/Tangential/Flight of ideas/Impaired reasoning
Linear/Other
Overinclusive/Circumstantial/Tangential/Flight of ideas/Impaired reasoning

## 2025-05-27 NOTE — BH INPATIENT PSYCHIATRY PROGRESS NOTE - NSBHMSEBODY_PSY_A_CORE
Average build
Overweight

## 2025-05-27 NOTE — BH INPATIENT PSYCHIATRY PROGRESS NOTE - NSTXDISORGPROGRES_PSY_ALL_CORE
[Normal RLE] : Right Lower Extremity: No scars, rashes, lesions, ulcers, skin intact
[Normal Touch] : sensation intact for touch
Improving
[Normal LLE] : Left Lower Extremity: No scars, rashes, lesions, ulcers, skin intact
[Normal] : No swelling, no edema, normal pedal pulses and normal temperature
[Obese] : obese
Improving
[Poor Appearance] : well-appearing
[Acute Distress] : not in acute distress
[de-identified] : Right Lower Extremity\par o Calf:\par ¦ Inspection/Palpation : mild medial calf tenderness along the right lower leg, no swelling, no deformity \par ¦ Tests and Signs: Lit’s Test (-)\par o Muscle Bulk : normal muscle bulk present\par o Skin : no erythema, no ecchymosis \par o Sensation : sensation to pin intact\par o Vascular Exam : no edema, no cyanosis, dorsalis pedis artery pulse 2+, posterior tibial artery pulse 2+ \par \par Left Lower Extremity\par o Knee :\par ¦ Inspection/Palpation : medial joint line tenderness, trace effusion, no deformity \par ¦ Range of Motion : 0 - 100 degrees\par ¦ Stability : no valgus or varus instability present on provocative testing, Lachman’s Test (-)\par ¦ Strength : flexion and extension 3/5\par o Muscle Bulk : normal muscle bulk present\par o Skin : no erythema, no ecchymosis \par o Sensation : sensation to pin intact\par o Vascular Exam : no edema, no cyanosis, dorsalis pedis artery pulse 2+, posterior tibial artery pulse 2+ 
Improving

## 2025-05-27 NOTE — BH INPATIENT PSYCHIATRY DISCHARGE NOTE - REASON FOR ADMISSION
severe bipolar manic psychosis in the context of medication nonadherence and comorbid THC and ETOH use

## 2025-05-27 NOTE — BH INPATIENT PSYCHIATRY PROGRESS NOTE - NSTXDISORGGOAL_PSY_ALL_CORE
Will identify 2 coping skills that assist in organizing
Will identify 2 coping skills that assist in organizing
Will demonstrate purposeful and predictable thoughts/behaviors by making a request
Will identify 2 coping skills that assist in organizing

## 2025-05-27 NOTE — BH INPATIENT PSYCHIATRY PROGRESS NOTE - NSTXDCOTHRPROGRES_PSY_ALL_CORE
No Change
No Change
Met - goal discontinued
No Change
Improving

## 2025-05-27 NOTE — BH INPATIENT PSYCHIATRY PROGRESS NOTE - NSTXSUBMISINTERMD_PSY_ALL_CORE
1. Informed consent obtained from the pt after pt consent for writer to speak with pt's family and with his fiancee to restart pt Lithium  150 mg po q am and 300 mg po qhs ( bipolar d/o)  2.DC Abilify due to lack of early efficacy  3.Plan to begin Seroquel 50 mg po qhs ( bipolar affective psychosis) to aid with insomnia  4.To titrate med regimen as clinically indicated and tolerated by the pt.  5.Pt encouraged to attend therapy groups as tolerated  6.Dual diagnosis discussion as to recommendation to DC THC gummies and ETOH  7.Disposition planning ongoing
1. Informed consent obtained from the pt after pt consent for writer to speak with pt's family and with his fiancee to restart pt Lithium  at 600  mg po qhs ( bipolar d/o)  2.DC Abilify due to lack of early efficacy  3.Plan to begin Seroquel increased to 100 mg po qhs ( bipolar affective psychosis) to aid with insomnia  4.To titrate med regimen as clinically indicated and tolerated by the pt.  5.Pt encouraged to attend therapy groups as tolerated  6.Dual diagnosis discussion as to recommendation to DC THC gummies and ETOH  7.Disposition planning ongoing
1. Informed consent obtained from the pt after pt consent for writer to speak with pt's family and with his fiancee to restart pt Lithium  150 mg po q am and 300 mg po qhs ( bipolar d/o)  2.DC Abilify due to lack of early efficacy  3.Plan to begin Seroquel 50 mg po qhs ( bipolar affective psychosis) to aid with insomnia  4.To titrate med regimen as clinically indicated and tolerated by the pt.  5.Pt encouraged to attend therapy groups as tolerated  6.Dual diagnosis discussion as to recommendation to DC THC gummies and ETOH  7.Disposition planning ongoing
1. Informed consent obtained from the pt after pt consent for writer to speak with pt's family and with his fiancee to restart pt Lithium  150 mg po q am and 300 mg po qhs ( bipolar d/o)  2.DC Abilify due to lack of early efficacy  3.Plan to begin Seroquel 50 mg po qhs ( bipolar affective psychosis) to aid with insomnia  4.To titrate med regimen as clinically indicated and tolerated by the pt.  5.Pt encouraged to attend therapy groups as tolerated  6.Dual diagnosis discussion as to recommendation to DC THC gummies and ETOH  7.Disposition planning ongoing
1. Informed consent obtained from the pt after pt consent for writer to speak with pt's family and with his fiancee to restart pt Lithium  at 600  mg po qhs ( bipolar d/o)  2.DC Abilify due to lack of early efficacy  3.Plan to begin Seroquel increased to 100 mg po qhs ( bipolar affective psychosis) to aid with insomnia  4.To titrate med regimen as clinically indicated and tolerated by the pt.  5.Pt encouraged to attend therapy groups as tolerated  6.Dual diagnosis discussion as to recommendation to DC THC gummies and ETOH  7.Disposition planning ongoing
1. Informed consent obtained from the pt after pt consent for writer to speak with pt's family and with his fiancee to restart pt Lithium  at 600  mg po qhs ( bipolar d/o)  2.DC Abilify due to lack of early efficacy  3.Plan to begin Seroquel increased to 100 mg po qhs ( bipolar affective psychosis) to aid with insomnia  4.To titrate med regimen as clinically indicated and tolerated by the pt.  5.Pt encouraged to attend therapy groups as tolerated  6.Dual diagnosis discussion as to recommendation to DC THC gummies and ETOH  7.Disposition planning ongoing

## 2025-05-27 NOTE — BH INPATIENT PSYCHIATRY PROGRESS NOTE - NSBHPSYCHOLCOGORIENT_PSY_A_CORE
Oriented to time, place, person, situation
Not fully oriented...
Oriented to time, place, person, situation
Not fully oriented...
Oriented to time, place, person, situation
Not fully oriented...
Not fully oriented...
Oriented to time, place, person, situation
Not fully oriented...

## 2025-05-27 NOTE — BH INPATIENT PSYCHIATRY PROGRESS NOTE - NSICDXBHSECONDARYDX_PSY_ALL_CORE
Cannabis use disorder   F12.90  Nonadherence to medication   Z91.148  History of alcohol use   Z87.898  
Cannabis use disorder   F12.90  Nonadherence to medication   Z91.148  
Cannabis use disorder   F12.90  Nonadherence to medication   Z91.148  History of alcohol use   Z87.898  
Cannabis use disorder   F12.90  Nonadherence to medication   Z91.148  
Cannabis use disorder   F12.90  Nonadherence to medication   Z91.148  History of alcohol use   Z87.898  
Cannabis use disorder   F12.90  Nonadherence to medication   Z91.148  

## 2025-05-27 NOTE — BH INPATIENT PSYCHIATRY PROGRESS NOTE - NSTXDCOTHRDATETRGT_PSY_ALL_CORE
27-May-2025
23-May-2025
21-May-2025
23-May-2025
23-May-2025
27-May-2025
23-May-2025
21-May-2025
23-May-2025

## 2025-05-27 NOTE — BH INPATIENT PSYCHIATRY PROGRESS NOTE - NSBHMSEAFFCONG_PSY_A_CORE
Non-congruent
Congruent
Non-congruent
Congruent
Non-congruent
Congruent

## 2025-05-27 NOTE — BH INPATIENT PSYCHIATRY DISCHARGE NOTE - NSDCRECOMMENDLABFT_PSY_ALL_CORE
Lithium level q 3-4 monthly  TSH,  HgA1C,  Fasting lipids, CBC with diff, CMP q 6 monthly with Lithium and with second generation antipsychotic Seroquel  Monthly vital signs including weight  and waist circumference with Lithium and with Seroquel

## 2025-05-27 NOTE — BH INPATIENT PSYCHIATRY PROGRESS NOTE - GENERAL APPEARANCE
No deformities present
No deformities present/Other
No deformities present

## 2025-05-27 NOTE — BH TREATMENT PLAN - NSTXDISORGINTERMD_PSY_ALL_CORE
1. Informed consent obtained from the pt after pt consent for writer to speak with pt's family and with his fiancee to restart pt Lithium  150 mg po q am and 300 mg po qhs ( bipolar d/o)  2.DC Abilify due to lack of early efficacy  3.Plan to begin Seroquel 50 mg po qhs ( bipolar affective psychosis) to aid with insomnia  4.To titrate med regimen as clinically indicated and tolerated by the pt.  5.Pt encouraged to attend therapy groups as tolerated  6.Dual diagnosis discussion as to recommendation to DC THC gummies and ETOH  7.Disposition planning ongoing
1. Informed consent obtained from the pt after pt consent for writer to speak with pt's family and with his fiancee to restart pt Lithium  600 mg po qhs ( bipolar d/o)  2.DC Abilify due to lack of early efficacy  3.Plan to titrate  Seroquel  to 100 mg po qhs ( bipolar affective psychosis) to aid with insomnia  4.To titrate med regimen as clinically indicated and tolerated by the pt.  5.Pt encouraged to attend therapy groups as tolerated  6.Dual diagnosis discussion as to recommendation to DC THC gummies and ETOH  7.Disposition planning ongoing
1. Informed consent obtained from the pt after pt consent for writer to speak with pt's family and with his fiancee to restart pt Lithium  150 mg po q am and  600 mg po qhs ( bipolar d/o)  2.DC Abilify due to lack of early efficacy  3.Plan to titrate  Seroquel  to 100 mg po qhs ( bipolar affective psychosis) to aid with insomnia  4.To titrate med regimen as clinically indicated and tolerated by the pt.  5.Pt encouraged to attend therapy groups as tolerated  6.Dual diagnosis discussion as to recommendation to DC THC gummies and ETOH  7.Disposition planning ongoing with pt DC from hospital on 5/27/25 and follow up therapy appt on 5/28/25 with KIM Zafar at 4 pm  and [facility] med management intake in person on 5/29/25 at 12:30 pm with PANTERA Finnegan

## 2025-05-27 NOTE — BH INPATIENT PSYCHIATRY DISCHARGE NOTE - OTHER PAST PSYCHIATRIC HISTORY (INCLUDE DETAILS REGARDING ONSET, COURSE OF ILLNESS, INPATIENT/OUTPATIENT TREATMENT)
Pt is a 50 y/o,  male currently domiciled at a private residence in [place]. Pt presented to [facility] on 5/10 as per EMR and was transferred to [address] on 5/13. As per EMR pt has a past hx of severe bipolar dx with psychosis affective dx. Pt age of onset of symptoms 13 with a hospitalization at that age. Successful past tx hx with Lithium as per EMR. Abilify 15 mg was given to pt and pt currently denies Lithium at this time. As per pt Lithium was unsuccessful and caused him lack of emotions.     At the time of interview pt presents as pleasant and remains is discharged focused. Pt denies SI/HI VH/AH. Pt reports that he was brought to the hospital after he had "an incident in [facility]". Pt reports that prior to hospitalize he was walking around the streets asking people to tattoo him with a pen. Pt reported he was making attempts to partake in high risk financial endeavours. Pt reports he was drinking alcohol, utilizing marijuana gummies, and drinking energy drinks/coffee with a decrease need for sleep. Pt reported to friends command hallucinations/SI and friends called 911 leading to hospitalization.      Judgement and insight appear to be hindered due to presenting current mental status. Pt reports he was currently prescribed Cymbalta, Adderall, Wellbutrin, and was prescribed vraylar however stopped medication due it "slowing him down".  Pt reports wanting to return to his current psychiatric NP and psychologist for after care.

## 2025-05-27 NOTE — BH TREATMENT PLAN - NSTXDCOTHRGOAL_PSY_ALL_CORE
Patient will be referred to the appropriate level of outpatient treatment and have appointments within 3-5 days of discharge.  Patient will be discharged to safe housing either back home or DSS emergency housing.  Patient will be seen by the medicaid specialist for benefits if needed   will explore need for duel diagnosis tx with appointments if needed.

## 2025-05-27 NOTE — BH TREATMENT PLAN - NSBHPRIMARYDX_PSY_ALL_CORE
Severe bipolar affective disorder with psychosis    
Cannabis use disorder    
Severe bipolar affective disorder with psychosis

## 2025-05-27 NOTE — BH TREATMENT PLAN - NSTXSUBMISINTERMD_PSY_ALL_CORE
1. Informed consent obtained from the pt after pt consent for writer to speak with pt's family and with his fiancee to restart pt Lithium  at 600  mg po qhs ( bipolar d/o)  2.DC Abilify due to lack of early efficacy  3.Plan to begin Seroquel increased to 100 mg po qhs ( bipolar affective psychosis) to aid with insomnia  4.To titrate med regimen as clinically indicated and tolerated by the pt.  5.Pt encouraged to attend therapy groups as tolerated  6.Dual diagnosis discussion as to recommendation to DC THC gummies and ETOH  7.Disposition planning ongoing
1. Informed consent obtained from the pt after pt consent for writer to speak with pt's family and with his fiancee to restart pt Lithium  150 mg po q am and  600 mg po qhs ( bipolar d/o)  2.DC Abilify due to lack of early efficacy  3.Plan to titrate  Seroquel  to 100 mg po qhs ( bipolar affective psychosis) to aid with insomnia  4.To titrate med regimen as clinically indicated and tolerated by the pt.  5.Pt encouraged to attend therapy groups as tolerated  6.Dual diagnosis discussion as to recommendation to DC THC gummies and ETOH  7.Disposition planning ongoing with pt DC from hospital on 5/27/25 and follow up therapy appt on 5/28/25 with KIM Zafar at 4 pm  and [facility] med management intake in person on 5/29/25 at 12:30 pm with PANTERA Finnegan
1. Informed consent obtained from the pt after pt consent for writer to speak with pt's family and with his fiancee to restart pt Lithium  150 mg po q am and 300 mg po qhs ( bipolar d/o)  2.DC Abilify due to lack of early efficacy  3.Plan to begin Seroquel 50 mg po qhs ( bipolar affective psychosis) to aid with insomnia  4.To titrate med regimen as clinically indicated and tolerated by the pt.  5.Pt encouraged to attend therapy groups as tolerated  6.Dual diagnosis discussion as to recommendation to DC THC gummies and ETOH  7.Disposition planning ongoing

## 2025-05-27 NOTE — BH INPATIENT PSYCHIATRY PROGRESS NOTE - NSTXDCOTHRDATEEST_PSY_ALL_CORE
14-May-2025
14-May-2025
16-May-2025
16-May-2025
27-May-2025
16-May-2025
23-May-2025

## 2025-05-27 NOTE — BH INPATIENT PSYCHIATRY DISCHARGE NOTE - NSBHMETABOLIC_PSY_ALL_CORE_FT
BMI: BMI (kg/m2): 31.4 (05-13-25 @ 14:36)  HbA1c: A1C with Estimated Average Glucose Result: 5.5 % (05-14-25 @ 08:32)    Glucose:   BP: --Vital Signs Last 24 Hrs  T(C): 36.6 (05-27-25 @ 07:15), Max: 36.6 (05-27-25 @ 07:15)  T(F): 97.9 (05-27-25 @ 07:15), Max: 97.9 (05-27-25 @ 07:15)  HR: --  BP: --  BP(mean): --  RR: 16 (05-27-25 @ 07:15) (16 - 16)  SpO2: 100% (05-27-25 @ 07:15) (100% - 100%)    Orthostatic VS  05-27-25 @ 07:15  Lying BP: --/-- HR: --  Sitting BP: 124/77 HR: 82  Standing BP: 106/83 HR: 95  Site: upper right arm  Mode: electronic  Orthostatic VS  05-26-25 @ 07:08  Lying BP: --/-- HR: --  Sitting BP: 115/76 HR: 84  Standing BP: 105/83 HR: 96  Site: upper right arm  Mode: electronic    Lipid Panel: Date/Time: 05-14-25 @ 08:32  Cholesterol, Serum: 203  LDL Cholesterol Calculated: 82  HDL Cholesterol, Serum: 102  Total Cholesterol/HDL Ration Measurement: --  Triglycerides, Serum: 109

## 2025-05-27 NOTE — BH INPATIENT PSYCHIATRY PROGRESS NOTE - NSBHMETABOLIC_PSY_ALL_CORE_FT
BMI: BMI (kg/m2): 31.4 (05-13-25 @ 14:36)  HbA1c: A1C with Estimated Average Glucose Result: 5.5 % (05-14-25 @ 08:32)    Glucose:   BP: --Vital Signs Last 24 Hrs  T(C): 36.5 (05-18-25 @ 06:34), Max: 36.5 (05-18-25 @ 06:34)  T(F): 97.7 (05-18-25 @ 06:34), Max: 97.7 (05-18-25 @ 06:34)  HR: --  BP: --  BP(mean): --  RR: 18 (05-18-25 @ 06:34) (18 - 18)  SpO2: 100% (05-18-25 @ 06:34) (100% - 100%)    Orthostatic VS  05-18-25 @ 06:34  Lying BP: --/-- HR: --  Sitting BP: 131/89 HR: 92  Standing BP: 119/79 HR: 105  Site: upper right arm  Mode: electronic  Orthostatic VS  05-17-25 @ 06:54  Lying BP: --/-- HR: --  Sitting BP: 134/83 HR: 82  Standing BP: 116/83 HR: 97  Site: upper right arm  Mode: electronic    Lipid Panel: Date/Time: 05-14-25 @ 08:32  Cholesterol, Serum: 203  LDL Cholesterol Calculated: 82  HDL Cholesterol, Serum: 102  Total Cholesterol/HDL Ration Measurement: --  Triglycerides, Serum: 109  
BMI: BMI (kg/m2): 31.4 (05-13-25 @ 14:36)  HbA1c: A1C with Estimated Average Glucose Result: 5.5 % (05-14-25 @ 08:32)    Glucose:   BP: --Vital Signs Last 24 Hrs  T(C): 36.3 (05-21-25 @ 06:51), Max: 36.3 (05-21-25 @ 06:51)  T(F): 97.4 (05-21-25 @ 06:51), Max: 97.4 (05-21-25 @ 06:51)  HR: --  BP: --  BP(mean): --  RR: 16 (05-21-25 @ 06:51) (16 - 16)  SpO2: 100% (05-21-25 @ 06:51) (100% - 100%)    Orthostatic VS  05-21-25 @ 06:51  Lying BP: --/-- HR: --  Sitting BP: 115/76 HR: 87  Standing BP: 121/80 HR: 96  Site: upper right arm  Mode: electronic  Orthostatic VS  05-20-25 @ 06:41  Lying BP: --/-- HR: --  Sitting BP: 124/77 HR: 78  Standing BP: 109/76 HR: 88  Site: upper right arm  Mode: electronic    Lipid Panel: Date/Time: 05-14-25 @ 08:32  Cholesterol, Serum: 203  LDL Cholesterol Calculated: 82  HDL Cholesterol, Serum: 102  Total Cholesterol/HDL Ration Measurement: --  Triglycerides, Serum: 109  
BMI: BMI (kg/m2): 31.4 (05-13-25 @ 14:36)  HbA1c: A1C with Estimated Average Glucose Result: 5.5 % (05-14-25 @ 08:32)    Glucose:   BP: --Vital Signs Last 24 Hrs  T(C): 36.4 (05-19-25 @ 06:40), Max: 36.4 (05-19-25 @ 06:40)  T(F): 97.5 (05-19-25 @ 06:40), Max: 97.5 (05-19-25 @ 06:40)  HR: --  BP: --  BP(mean): --  RR: 17 (05-19-25 @ 06:40) (17 - 17)  SpO2: 100% (05-19-25 @ 06:40) (100% - 100%)    Orthostatic VS  05-19-25 @ 06:40  Lying BP: --/-- HR: --  Sitting BP: 119/77 HR: 68  Standing BP: 115/79 HR: 97  Site: upper right arm  Mode: electronic  Orthostatic VS  05-18-25 @ 06:34  Lying BP: --/-- HR: --  Sitting BP: 131/89 HR: 92  Standing BP: 119/79 HR: 105  Site: upper right arm  Mode: electronic    Lipid Panel: Date/Time: 05-14-25 @ 08:32  Cholesterol, Serum: 203  LDL Cholesterol Calculated: 82  HDL Cholesterol, Serum: 102  Total Cholesterol/HDL Ration Measurement: --  Triglycerides, Serum: 109  
BMI: BMI (kg/m2): 31.4 (05-13-25 @ 14:36)  HbA1c: A1C with Estimated Average Glucose Result: 5.5 % (05-14-25 @ 08:32)    Glucose:   BP: --Vital Signs Last 24 Hrs  T(C): 36.6 (05-14-25 @ 07:14), Max: 36.6 (05-14-25 @ 07:14)  T(F): 97.8 (05-14-25 @ 07:14), Max: 97.8 (05-14-25 @ 07:14)  HR: --  BP: --  BP(mean): --  RR: 18 (05-14-25 @ 07:14) (18 - 18)  SpO2: 100% (05-14-25 @ 07:14) (100% - 100%)    Orthostatic VS  05-14-25 @ 07:14  Lying BP: --/-- HR: --  Sitting BP: 123/78 HR: 91  Standing BP: 119/87 HR: 95  Site: upper right arm  Mode: electronic  Orthostatic VS  05-13-25 @ 12:00  Lying BP: --/-- HR: --  Sitting BP: 124/92 HR: 88  Standing BP: 123/94 HR: 96  Site: --  Mode: --    Lipid Panel: Date/Time: 05-14-25 @ 08:32  Cholesterol, Serum: 203  LDL Cholesterol Calculated: 82  HDL Cholesterol, Serum: 102  Total Cholesterol/HDL Ration Measurement: --  Triglycerides, Serum: 109  
BMI: BMI (kg/m2): 31.4 (05-13-25 @ 14:36)  HbA1c: A1C with Estimated Average Glucose Result: 5.5 % (05-14-25 @ 08:32)    Glucose:   BP: --Vital Signs Last 24 Hrs  T(C): 36.6 (05-17-25 @ 06:54), Max: 36.6 (05-17-25 @ 06:54)  T(F): 97.8 (05-17-25 @ 06:54), Max: 97.8 (05-17-25 @ 06:54)  HR: --  BP: --  BP(mean): --  RR: 18 (05-17-25 @ 06:54) (18 - 18)  SpO2: 100% (05-17-25 @ 06:54) (100% - 100%)    Orthostatic VS  05-17-25 @ 06:54  Lying BP: --/-- HR: --  Sitting BP: 134/83 HR: 82  Standing BP: 116/83 HR: 97  Site: upper right arm  Mode: electronic  Orthostatic VS  05-16-25 @ 06:54  Lying BP: --/-- HR: --  Sitting BP: 116/68 HR: 98  Standing BP: 105/67 HR: 123  Site: upper right arm  Mode: electronic    Lipid Panel: Date/Time: 05-14-25 @ 08:32  Cholesterol, Serum: 203  LDL Cholesterol Calculated: 82  HDL Cholesterol, Serum: 102  Total Cholesterol/HDL Ration Measurement: --  Triglycerides, Serum: 109  
BMI: BMI (kg/m2): 31.4 (05-13-25 @ 14:36)  HbA1c: A1C with Estimated Average Glucose Result: 5.5 % (05-14-25 @ 08:32)    Glucose:   BP: --Vital Signs Last 24 Hrs  T(C): 36.4 (05-23-25 @ 07:31), Max: 36.4 (05-23-25 @ 07:31)  T(F): 97.6 (05-23-25 @ 07:31), Max: 97.6 (05-23-25 @ 07:31)  HR: --  BP: --  BP(mean): --  RR: 16 (05-23-25 @ 07:31) (16 - 16)  SpO2: 100% (05-23-25 @ 07:31) (100% - 100%)    Orthostatic VS  05-23-25 @ 07:31  Lying BP: --/-- HR: --  Sitting BP: 104/59 HR: 84  Standing BP: 104/65 HR: 89  Site: upper right arm  Mode: electronic  Orthostatic VS  05-22-25 @ 06:56  Lying BP: --/-- HR: --  Sitting BP: 115/74 HR: 79  Standing BP: 104/76 HR: 88  Site: upper right arm  Mode: electronic    Lipid Panel: Date/Time: 05-14-25 @ 08:32  Cholesterol, Serum: 203  LDL Cholesterol Calculated: 82  HDL Cholesterol, Serum: 102  Total Cholesterol/HDL Ration Measurement: --  Triglycerides, Serum: 109  
BMI: BMI (kg/m2): 31.4 (05-13-25 @ 14:36)  HbA1c: A1C with Estimated Average Glucose Result: 5.5 % (05-14-25 @ 08:32)    Glucose:   BP: --Vital Signs Last 24 Hrs  T(C): 36.4 (05-16-25 @ 06:54), Max: 36.4 (05-16-25 @ 06:54)  T(F): 97.5 (05-16-25 @ 06:54), Max: 97.5 (05-16-25 @ 06:54)  HR: --  BP: --  BP(mean): --  RR: 16 (05-16-25 @ 06:54) (16 - 16)  SpO2: 100% (05-16-25 @ 06:54) (100% - 100%)    Orthostatic VS  05-16-25 @ 06:54  Lying BP: --/-- HR: --  Sitting BP: 116/68 HR: 98  Standing BP: 105/67 HR: 123  Site: upper right arm  Mode: electronic  Orthostatic VS  05-15-25 @ 07:05  Lying BP: --/-- HR: --  Sitting BP: 133/80 HR: 114  Standing BP: 123/91 HR: 115  Site: upper right arm  Mode: electronic    Lipid Panel: Date/Time: 05-14-25 @ 08:32  Cholesterol, Serum: 203  LDL Cholesterol Calculated: 82  HDL Cholesterol, Serum: 102  Total Cholesterol/HDL Ration Measurement: --  Triglycerides, Serum: 109  
BMI: BMI (kg/m2): 31.4 (05-13-25 @ 14:36)  HbA1c: A1C with Estimated Average Glucose Result: 5.5 % (05-14-25 @ 08:32)    Glucose:   BP: --Vital Signs Last 24 Hrs  T(C): 36.3 (05-22-25 @ 06:56), Max: 36.3 (05-22-25 @ 06:56)  T(F): 97.3 (05-22-25 @ 06:56), Max: 97.3 (05-22-25 @ 06:56)  HR: --  BP: --  BP(mean): --  RR: 16 (05-22-25 @ 06:56) (16 - 16)  SpO2: 100% (05-22-25 @ 06:56) (100% - 100%)    Orthostatic VS  05-22-25 @ 06:56  Lying BP: --/-- HR: --  Sitting BP: 115/74 HR: 79  Standing BP: 104/76 HR: 88  Site: upper right arm  Mode: electronic  Orthostatic VS  05-21-25 @ 06:51  Lying BP: --/-- HR: --  Sitting BP: 115/76 HR: 87  Standing BP: 121/80 HR: 96  Site: upper right arm  Mode: electronic    Lipid Panel: Date/Time: 05-14-25 @ 08:32  Cholesterol, Serum: 203  LDL Cholesterol Calculated: 82  HDL Cholesterol, Serum: 102  Total Cholesterol/HDL Ration Measurement: --  Triglycerides, Serum: 109  
BMI: BMI (kg/m2): 31.4 (05-13-25 @ 14:36)  HbA1c: A1C with Estimated Average Glucose Result: 5.5 % (05-14-25 @ 08:32)    Glucose:   BP: --Vital Signs Last 24 Hrs  T(C): 36.3 (05-20-25 @ 06:41), Max: 36.3 (05-20-25 @ 06:41)  T(F): 97.3 (05-20-25 @ 06:41), Max: 97.3 (05-20-25 @ 06:41)  HR: --  BP: --  BP(mean): --  RR: 17 (05-20-25 @ 06:41) (17 - 17)  SpO2: 100% (05-20-25 @ 06:41) (100% - 100%)    Orthostatic VS  05-20-25 @ 06:41  Lying BP: --/-- HR: --  Sitting BP: 124/77 HR: 78  Standing BP: 109/76 HR: 88  Site: upper right arm  Mode: electronic  Orthostatic VS  05-19-25 @ 06:40  Lying BP: --/-- HR: --  Sitting BP: 119/77 HR: 68  Standing BP: 115/79 HR: 97  Site: upper right arm  Mode: electronic    Lipid Panel: Date/Time: 05-14-25 @ 08:32  Cholesterol, Serum: 203  LDL Cholesterol Calculated: 82  HDL Cholesterol, Serum: 102  Total Cholesterol/HDL Ration Measurement: --  Triglycerides, Serum: 109  
BMI: BMI (kg/m2): 31.4 (05-13-25 @ 14:36)  HbA1c: A1C with Estimated Average Glucose Result: 5.5 % (05-14-25 @ 08:32)    Glucose:   BP: --Vital Signs Last 24 Hrs  T(C): 36.6 (05-15-25 @ 07:05), Max: 36.6 (05-15-25 @ 07:05)  T(F): 97.8 (05-15-25 @ 07:05), Max: 97.8 (05-15-25 @ 07:05)  HR: --  BP: --  BP(mean): --  RR: 18 (05-15-25 @ 07:05) (18 - 18)  SpO2: 100% (05-15-25 @ 07:05) (100% - 100%)    Orthostatic VS  05-15-25 @ 07:05  Lying BP: --/-- HR: --  Sitting BP: 133/80 HR: 114  Standing BP: 123/91 HR: 115  Site: upper right arm  Mode: electronic  Orthostatic VS  05-14-25 @ 07:14  Lying BP: --/-- HR: --  Sitting BP: 123/78 HR: 91  Standing BP: 119/87 HR: 95  Site: upper right arm  Mode: electronic    Lipid Panel: Date/Time: 05-14-25 @ 08:32  Cholesterol, Serum: 203  LDL Cholesterol Calculated: 82  HDL Cholesterol, Serum: 102  Total Cholesterol/HDL Ration Measurement: --  Triglycerides, Serum: 109  
BMI: BMI (kg/m2): 31.4 (05-13-25 @ 14:36)  HbA1c: A1C with Estimated Average Glucose Result: 5.5 % (05-14-25 @ 08:32)    Glucose:   BP: --Vital Signs Last 24 Hrs  T(C): 36.6 (05-27-25 @ 07:15), Max: 36.6 (05-27-25 @ 07:15)  T(F): 97.9 (05-27-25 @ 07:15), Max: 97.9 (05-27-25 @ 07:15)  HR: --  BP: --  BP(mean): --  RR: 16 (05-27-25 @ 07:15) (16 - 16)  SpO2: 100% (05-27-25 @ 07:15) (100% - 100%)    Orthostatic VS  05-27-25 @ 07:15  Lying BP: --/-- HR: --  Sitting BP: 124/77 HR: 82  Standing BP: 106/83 HR: 95  Site: upper right arm  Mode: electronic  Orthostatic VS  05-26-25 @ 07:08  Lying BP: --/-- HR: --  Sitting BP: 115/76 HR: 84  Standing BP: 105/83 HR: 96  Site: upper right arm  Mode: electronic    Lipid Panel: Date/Time: 05-14-25 @ 08:32  Cholesterol, Serum: 203  LDL Cholesterol Calculated: 82  HDL Cholesterol, Serum: 102  Total Cholesterol/HDL Ration Measurement: --  Triglycerides, Serum: 109

## 2025-05-27 NOTE — BH TREATMENT PLAN - NSTXCAREGIVERPARTICIPATE_PSY_P_CORE
Family/Caregiver participated in identification of needs/problems/goals for treatment/Family/Caregiver participated in defining interventions/Family/Caregiver participated in development of after care plan
Family/Caregiver participated in identification of needs/problems/goals for treatment
No, patient unwilling to involve family/caregiver
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
## 2025-05-27 NOTE — BH INPATIENT PSYCHIATRY PROGRESS NOTE - NSTXSUBMISDATEEST_PSY_ALL_CORE
14-May-2025

## 2025-05-27 NOTE — BH INPATIENT PSYCHIATRY PROGRESS NOTE - OTHER
thinking becoming more reality based "I feel good now."  more judicious reasoning very social but becoming better able to establish and to maintain boundaries  cooperative in a more euthymic clinical state affect  more appropriate in range and intensity

## 2025-05-27 NOTE — BH INPATIENT PSYCHIATRY DISCHARGE NOTE - NSBHASSESSSUMMFT_PSY_ALL_CORE
pt denies any suicidal ideation intent or plan   mitigating pt with medication   protective pt with family and place to live   chronic pt with prior psych hx    5/20/25 Pt seen and discussed with treatment team. The pt continues to slowly clinically improve with a gradual decrease in the overall clinical symptoms of his bipolar d/o since the pt began Lithium now at 600 mg po qhs after years of having self discontinued this key evidence based treatment for bipolar 1 disorder. The pt reported improved sleep and good appetite with well tolerated Lithium and Seroquel 100 mg po qhs  without reported adverse effects.  The pt appears with an overall  calmer demeanor with good eye contact and with  speech better modulated in rate, volume and productivity. The pt , once again has now withdrawn his verbal consent for hospital staff to contact his parents though he has spoken with them on the phone. The pt reported his understanding of their concern for his health and he shares their love of one another, but he spoke of being able to care foe himself independently  at this juncture in his life. The pt has been attending therapy groups but noted that other peers were leaving while he remained in hospital. The pt's nino has been visiting daily and she continues to be a major support for the pt at this time.  The pt denies any current SI / HI /AH and he currently presents as a low risk for either suicide or for impulsive aggression.  The pt's judgment and insight continue to improve though they remain somewhat impaired.   5/21/25 Pt seen and discussed with treatment team. The pt continues to clinically improve and he and his immediate family remain discharge focussed. The pt is tolerating his current med regimen of Lithium 600 mg po qhs and Seroquel 100 mg po qhs with ongoing clinical alleviation of the pt's bipolar affective d/o with psychosis. The pt reported good sleep and appetite and he has been attending therapy groups and participating meaningfully. The pt denies current SI / HI /AH and he presents currently as a low risk for either suicide or for impulsive aggression. The pt's judgment is improving though his insight remains somewhat limited.  5/22/25 Pt seen  and discussed with treatment team and earlier with the pt's nino with pt consent.  The pt continues to clinically improve with more stable mood and with more linear and organized thinking. The pt has decided to rinse off markers to his forearm indicating the days he has been in hospital. The pt visited briefly with his parents and the visit went alright. The pt is tolerating his current med regimen without any adverse effects and with good clinical effect in stabilizing his primary bipolar affective disorder. The pt denies any current SI /HI / AH  .   5/23/25 Pt seen and discussed with treatment team and with the pt's fiancee with pt consent.  The pt expressed increasing awareness as to his ability to maintain his composure and to be better focussed with more appropriate boundaries , with pt better ability he noted to maintain his composure even when interacting with potentially " triggering" friends and family.  The pt reported good sleep and appetite and he is clinically improving with effective med regimen of Lithium 150 mg po q am and 600 mg po qhs and Seroquel 100 mg po qhs to treat the pt's bipolar affective psychosis.  Writer again spoke with the pt as to the recommendation for the pt to remain on his current med regimen and to not make any unilateral med changes without discussing this with his outpatient new med management treatment team.  The pt was amenable to this plan and has great support from friends and family. The pt denies any current SI / HI /AH and he remains a low current risk for either suicide or for impulsive aggression.   5/27/25 Pt seen and discussed  with treatment team prior to pt DC home from hospital on 5/27/25. The pt reported and staff have noted pt ongoing clinical improvement with continued mood stabilization, good sleep and appetite and more linear  and coherent organized thinking and goal directed speech. The pt continues to tolerate his current med regimen of Lithium 150 mg po q am and 600 mg po qhs along with Seroquel 100 mg po qhs  with good clinical efficacy and tolerated without any reported adverse effects. The pt has been visible on the unit and has been able to be meaningfully interactive with peers and staff with therapy group participation.  The pt remains future oriented and he continues to deny any current SI /HI /AH /VH. The pt was given more psychoeducational information related to his current med regimen and the importance of sleep hygiene, med compliance and with the ongoing recommendation that the pt limit / abstain from use of THC or ETOH in light of the potential for exacerbation of the pt's mood d/o.        Clinical handoff has been completed by hospital staff and the pt's medications have been e-prescribed to his [facility] in [place]  x a 30 day med supply with no refill. The pt has a follow up outpatient therapy appointment on 5/28/25 at 4 pm with  Angel Zafar LCSW . The pt also has a new outpatient med management appointment with [facility]  Psychiatry in person on 5/29/25 at 12:30 pm  with PANTERA Finnegan for ongoing med management of the pt's bipolar d/o with Lithium and Seroquel as above. The pt is aware that he can contact Dr Ely at [facility] at tel [phone] with any further questions or concerns.

## 2025-05-27 NOTE — BH INPATIENT PSYCHIATRY PROGRESS NOTE - NSBHMSEMOOD_PSY_A_CORE
Anxious/Euphoria
Anxious/Euphoria
Anxious/Other
Anxious/Euphoria
Anxious/Other
Anxious/Other

## 2025-05-27 NOTE — BH TREATMENT PLAN - NSTXCOPEINTERPR_PSY_ALL_CORE
Pt will continue with tx goals
Pt encouraged to attend 1-2 therapy groups per day to develop and improve coping skills.
PT scheduled for discharge

## 2025-05-27 NOTE — BH TREATMENT PLAN - NSTXPLANTHERAPYSESSIONSFT_PSY_ALL_CORE
05-25-25  Type of therapy: Coping skills, Creative arts therapy  Type of session: Group  Level of patient participation: Engaged, Participates  Duration of participation: 60 minutes  Therapy conducted by: Psych rehab  Therapy Summary: Patient attended all group programming. Social with peers. Appears calmer. Education and support ongoing.    05-26-25  Type of therapy: Creative arts therapy, Music therapy, Group Games   Type of session: Group  Level of patient participation: Participates  Duration of participation: 60 minutes  Therapy conducted by: Psych rehab  Therapy Summary: Attended all group programming today. Shared he is feeling level and in control on new medication. Reporting progress and looking forward to discharge planning.  
  05-18-25  Type of therapy: Creative arts therapy, Mindfulness, Music therapy, Symptom management  Type of session: Group  Level of patient participation: Engaged, Participates  Duration of participation: 60 minutes  Therapy conducted by: Psych rehab  Therapy Summary: Attended all group programming today. Social on the unit. Reported feeling good on medication. Education and support ongoing.

## 2025-05-27 NOTE — BH INPATIENT PSYCHIATRY DISCHARGE NOTE - HOSPITAL COURSE
5/20/25 Pt seen and discussed with treatment team. The pt continues to slowly clinically improve with a gradual decrease in the overall clinical symptoms of his bipolar d/o since the pt began Lithium now at 600 mg po qhs after years of having self discontinued this key evidence based treatment for bipolar 1 disorder. The pt reported improved sleep and good appetite with well tolerated Lithium and Seroquel 100 mg po qhs  without reported adverse effects.  The pt appears with an overall  calmer demeanor with good eye contact and with  speech better modulated in rate, volume and productivity. The pt , once again has now withdrawn his verbal consent for hospital staff to contact his parents though he has spoken with them on the phone. The pt reported his understanding of their concern for his health and he shares their love of one another, but he spoke of being able to care foe himself independently  at this juncture in his life. The pt has been attending therapy groups but noted that other peers were leaving while he remained in hospital. The pt's nino has been visiting daily and she continues to be a major support for the pt at this time.  The pt denies any current SI / HI /AH and he currently presents as a low risk for either suicide or for impulsive aggression.  The pt's judgment and insight continue to improve though they remain somewhat impaired.   5/21/25 Pt seen and discussed with treatment team. The pt continues to clinically improve and he and his immediate family remain discharge focussed. The pt is tolerating his current med regimen of Lithium 600 mg po qhs and Seroquel 100 mg po qhs with ongoing clinical alleviation of the pt's bipolar affective d/o with psychosis. The pt reported good sleep and appetite and he has been attending therapy groups and participating meaningfully. The pt denies current SI / HI /AH and he presents currently as a low risk for either suicide or for impulsive aggression. The pt's judgment is improving though his insight remains somewhat limited.  5/22/25 Pt seen  and discussed with treatment team and earlier with the pt's nino with pt consent.  The pt continues to clinically improve with more stable mood and with more linear and organized thinking. The pt has decided to rinse off markers to his forearm indicating the days he has been in hospital. The pt visited briefly with his parents and the visit went alright. The pt is tolerating his current med regimen without any adverse effects and with good clinical effect in stabilizing his primary bipolar affective disorder. The pt denies any current SI /HI / AH  .   5/23/25 Pt seen and discussed with treatment team and with the pt's fiancee with pt consent.  The pt expressed increasing awareness as to his ability to maintain his composure and to be better focussed with more appropriate boundaries , with pt better ability he noted to maintain his composure even when interacting with potentially " triggering" friends and family.  The pt reported good sleep and appetite and he is clinically improving with effective med regimen of Lithium 150 mg po q am and 600 mg po qhs and Seroquel 100 mg po qhs to treat the pt's bipolar affective psychosis.  Writer again spoke with the pt as to the recommendation for the pt to remain on his current med regimen and to not make any unilateral med changes without discussing this with his outpatient new med management treatment team.  The pt was amenable to this plan and has great support from friends and family. The pt denies any current SI / HI /AH and he remains a low current risk for either suicide or for impulsive aggression.   5/27/25 Pt seen and discussed  with treatment team prior to pt DC home from hospital on 5/27/25. The pt reported and staff have noted pt ongoing clinical improvement with continued mood stabilization, good sleep and appetite and more linear  and coherent organized thinking and goal directed speech. The pt continues to tolerate his current med regimen of Lithium 150 mg po q am and 600 mg po qhs along with Seroquel 100 mg po qhs  with good clinical efficacy and tolerated without any reported adverse effects. The pt has been visible on the unit and has been able to be meaningfully interactive with peers and staff with therapy group participation.  The pt remains future oriented and he continues to deny any current SI /HI /AH /VH. The pt was given more psychoeducational information related to his current med regimen and the importance of sleep hygiene, med compliance and with the ongoing recommendation that the pt limit / abstain from use of THC or ETOH in light of the potential for exacerbation of the pt's mood d/o.        Clinical handoff has been completed by hospital staff and the pt's medications have been e-prescribed to his [facility] in [place]  x a 30 day med supply with no refill. The pt has a follow up outpatient therapy appointment on 5/28/25 at 4 pm with  Angel Zafar LCSW . The pt also has a new outpatient med management appointment with [facility]  Psychiatry in person on 5/29/25 at 12:30 pm  with NP Shreyas Finnegan for ongoing med management of the pt's bipolar d/o with Lithium and Seroquel as above. The pt is aware that he can contact Dr Ely at [facility] at tel [phone] with any further questions or concerns.  Discharge Criteria	acute resolution of the pt's current bipolar manic psychosis  to assess pt motivation as to ETOH / THC use and harm reduction

## 2025-05-27 NOTE — BH TREATMENT PLAN - NSTXDCOTHRINTERSW_PSY_ALL_CORE
SW met with pt in dayroom and introduce self as pt’s primary SW and reviewed role. Pt expressed understanding. Pt agreeable to return to his previous providers and his home address. Pt only consenting for tx team to speak to his firaheel Keiry Clifford at this time. Pt discussed the planning of his wedding and his excitement. Pt complaining that he is very thirsty and that he is not being given enough water due to his medication. SW informed RN  and psych MD, pt is not on a water restriction.
SW to meet with pt to address the above goals when pt is stable for discharge.
KIM obtained appt with [facility] for 5/29 @ 12pm. KIM met with pt in dayroom and reviewed appts with NP and therapist, pt expressed understanding and agreement to attend. Pt reports looking forward to returning home and spending time with dtr and dog. KIM spoke with suyapa and reviewed appts, suyapa expressed understanding. Michelaancé to  at time of dc.

## 2025-05-27 NOTE — BH INPATIENT PSYCHIATRY DISCHARGE NOTE - HPI (INCLUDE ILLNESS QUALITY, SEVERITY, DURATION, TIMING, CONTEXT, MODIFYING FACTORS, ASSOCIATED SIGNS AND SYMPTOMS)
The pt is a 49 yr-old WM diagnosed with juvenile onset bipolar d/o at age 13 , hospitalized once at that time and reportedly successfully treated with Lithium. 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 The pt's parents live nearby and the pt's 16 year-old daughter will be visiting colleges as she plans ahead after high school.     The pt , recently reported by friends and other concerned loved ones, reported safety issues related to what appeared to be a  pt clinical relapse in the context of medication nonadherence and comorbid THC and ETOH use  with resultant pt worsening bipolar manic psychosis  such that the pt was initially brought [facility] after the pt had been acting erratically and reportedly irrationally in [facility].  This writer had frequent phone contact with the ED doctor at [facility] who had begun the pt on Abilify to treat the pt's affective psychosis with grandiose , paranoid, and Taoist delusional thinking  associated with decreased need for sleep  and flight of ideas with grandiose , well meaning but risky plans the pt wished to implement. The pt had declined Lithium or Depakote restart  to assist with his severe and functionally impairing bipolar d/o and he remained in the Emergency Observation Unit  at [facility] from 5/10/25 until his transfer on 5/13/25 on a 9.27 2 PC legal status to his home town family location  in [place] , to  [facility] for admission to  [facility] inpatient psychiatry for pt safety and for ongoing pt evaluation and treatment of his bipolar d/o.       When seen today on  by this writer, the pt presented as cordial with speech increased in rate and productivity with a rapid succession of grandiose  and possibly injudicious plans for himself, his family and those around him. The pt tended to dismiss the safety concerns of all those around him , the pt's insisting that they were overreacting and had no reason to worry. The pt agreed to continue with newly begun Abilify to be increased to 15 mg po q am while the pt continues to decline a restart of likely potentially helpful Lithium to treat his bipolar d/o. The pt denied any current SI /HI /AH and he presented as graciously glib and carefree. The pt's judgment appeared impaired with limited insight at this time.

## 2025-05-27 NOTE — BH TREATMENT PLAN - NSPTSTATEDGOAL_PSY_ALL_CORE
" I want to be stable so I can be with my family again."
"I want to regulate; I do not want to have the lows" 
" I want to be stable so I can be with my family again."

## 2025-05-27 NOTE — BH INPATIENT PSYCHIATRY PROGRESS NOTE - NSBHMSEAFFQUAL_PSY_A_CORE
Elevated/Irritable
Euthymic
Elevated/Irritable
Elevated/Irritable
Anxious/Other
Elevated/Anxious
Elevated/Irritable
Anxious/Other
Elevated/Irritable
Anxious/Other
Anxious/Other

## 2025-05-27 NOTE — BH INPATIENT PSYCHIATRY PROGRESS NOTE - NSBHFUPINTERVALCCFT_PSY_A_CORE
" I'm feeling good. Better, I think. Sleeping and eating good. Going to groups."     Lithium level = 0.4 mmol/L ( 5/20/25)  BUN/ Creatinine = 14/1.09 /82
 " I think I'm doing really good. Sleeping good. Eating good. Going to groups. I felt a little tired when I was exercising for 90 minutes on the cycle but otherwise I;m ok. "     Lithium level = 0.4 mmol/L ( 5/20/25)  BUN/ Creatinine = 14/1.09 /82
 " I think I am doing better but I know I need to be here a little while because I was manic.  "
 " I'm doing good. I feel good. Ready to go home."     Lithium level = 0.6 mmol/L ( 5/27/25)  BUN/ Creatinine/ GFR  = 15/1.11 /80
 " I'm doing really good. I jus worry I'll have to stay longer if my Lithium level isn't good. "   The pt indicated where he had been counting the days on his forearm with markers .
"I think I'm doing really good. I'm hoping I can leave soon but I'm prepared if I have to stay longer."      Lithium level = 0.4 mmol/L ( 5/20/25)
 " I think I'm doing alright."     Lithium level = 0.4 mmol/L ( 5/20/25)  BUN/ Creatinine = 14/1.09 /82
" I don't think I understood  but now I think I'm gaining some insight into what this bipolar diagnosis is.  And why medication is important."
" I'm really feeling better. I didn't sleep well last night  but I really hope I can leave in the next couple of days." 
"I'm still good , just wanted to introduce myself."
"I'm feeling better"

## 2025-05-27 NOTE — BH INPATIENT PSYCHIATRY PROGRESS NOTE - NSTXDISORGDATETRGT_PSY_ALL_CORE
21-May-2025
25-May-2025
25-May-2025
20-May-2025
21-May-2025
20-May-2025
25-May-2025
01-Jun-2025
25-May-2025

## 2025-05-27 NOTE — BH INPATIENT PSYCHIATRY PROGRESS NOTE - CURRENT MEDICATION
MEDICATIONS  (STANDING):  lithium 300 milliGRAM(s) Oral at bedtime  lithium 150 milliGRAM(s) Oral daily  QUEtiapine 50 milliGRAM(s) Oral at bedtime    MEDICATIONS  (PRN):  acetaminophen     Tablet .. 650 milliGRAM(s) Oral every 6 hours PRN Temp greater or equal to 38C (100.4F), Mild Pain (1 - 3), Moderate Pain (4 - 6)  aluminum hydroxide/magnesium hydroxide/simethicone Suspension 30 milliLiter(s) Oral every 6 hours PRN Dyspepsia  diphenhydrAMINE 50 milliGRAM(s) Oral every 6 hours PRN Extrapyramidal prophylaxis  diphenhydrAMINE Injectable 50 milliGRAM(s) IntraMuscular once PRN Extrapyramidal prophylaxis  haloperidol     Tablet 5 milliGRAM(s) Oral every 6 hours PRN mild to moderate agitation due to bipolar psychosis  haloperidol    Injectable 5 milliGRAM(s) IntraMuscular once PRN severe agitation due to affective psychosis  LORazepam     Tablet 1 milliGRAM(s) Oral every 6 hours PRN mild to moderate anxiety  LORazepam   Injectable 2 milliGRAM(s) IntraMuscular once PRN severe agitation due to affective psychosis  magnesium hydroxide Suspension 30 milliLiter(s) Oral daily PRN Constipation  nicotine  Polacrilex Gum 2 milliGRAM(s) Oral every 2 hours PRN Smoking Cessation  
MEDICATIONS  (STANDING):  lithium 600 milliGRAM(s) Oral at bedtime  QUEtiapine 100 milliGRAM(s) Oral at bedtime  senna 2 Tablet(s) Oral at bedtime  triamcinolone 0.1% Cream 1 Application(s) Topical two times a day    MEDICATIONS  (PRN):  acetaminophen     Tablet .. 650 milliGRAM(s) Oral every 6 hours PRN Temp greater or equal to 38C (100.4F), Mild Pain (1 - 3), Moderate Pain (4 - 6)  aluminum hydroxide/magnesium hydroxide/simethicone Suspension 30 milliLiter(s) Oral every 6 hours PRN Dyspepsia  diphenhydrAMINE 50 milliGRAM(s) Oral every 6 hours PRN Extrapyramidal prophylaxis  diphenhydrAMINE Injectable 50 milliGRAM(s) IntraMuscular once PRN Extrapyramidal prophylaxis  haloperidol     Tablet 5 milliGRAM(s) Oral every 6 hours PRN mild to moderate agitation due to bipolar psychosis  haloperidol    Injectable 5 milliGRAM(s) IntraMuscular once PRN severe agitation due to affective psychosis  LORazepam     Tablet 1 milliGRAM(s) Oral every 6 hours PRN mild to moderate anxiety due to bipolar d/o  magnesium hydroxide Suspension 30 milliLiter(s) Oral daily PRN Constipation  nicotine  Polacrilex Gum 2 milliGRAM(s) Oral every 2 hours PRN Smoking Cessation  
MEDICATIONS  (STANDING):  lidocaine 2% Viscous 10 milliLiter(s) Swish and Spit three times a day  lithium 150 milliGRAM(s) Oral daily  lithium 600 milliGRAM(s) Oral at bedtime  QUEtiapine 100 milliGRAM(s) Oral at bedtime  senna 2 Tablet(s) Oral at bedtime  triamcinolone 0.1% Cream 1 Application(s) Topical two times a day    MEDICATIONS  (PRN):  acetaminophen     Tablet .. 650 milliGRAM(s) Oral every 6 hours PRN Temp greater or equal to 38C (100.4F), Mild Pain (1 - 3), Moderate Pain (4 - 6)  aluminum hydroxide/magnesium hydroxide/simethicone Suspension 30 milliLiter(s) Oral every 6 hours PRN Dyspepsia  diphenhydrAMINE 50 milliGRAM(s) Oral every 6 hours PRN Extrapyramidal prophylaxis  diphenhydrAMINE Injectable 50 milliGRAM(s) IntraMuscular once PRN Extrapyramidal prophylaxis  haloperidol     Tablet 5 milliGRAM(s) Oral every 6 hours PRN mild to moderate agitation due to bipolar psychosis  haloperidol    Injectable 5 milliGRAM(s) IntraMuscular once PRN severe agitation due to affective psychosis  LORazepam     Tablet 1 milliGRAM(s) Oral every 6 hours PRN mild to moderate anxiety due to bipolar d/o  magnesium hydroxide Suspension 30 milliLiter(s) Oral daily PRN Constipation  nicotine  Polacrilex Gum 2 milliGRAM(s) Oral every 2 hours PRN Smoking Cessation  
MEDICATIONS  (STANDING):  lithium 600 milliGRAM(s) Oral at bedtime  QUEtiapine 100 milliGRAM(s) Oral at bedtime  senna 2 Tablet(s) Oral at bedtime  triamcinolone 0.1% Cream 1 Application(s) Topical two times a day    MEDICATIONS  (PRN):  acetaminophen     Tablet .. 650 milliGRAM(s) Oral every 6 hours PRN Temp greater or equal to 38C (100.4F), Mild Pain (1 - 3), Moderate Pain (4 - 6)  aluminum hydroxide/magnesium hydroxide/simethicone Suspension 30 milliLiter(s) Oral every 6 hours PRN Dyspepsia  diphenhydrAMINE 50 milliGRAM(s) Oral every 6 hours PRN Extrapyramidal prophylaxis  diphenhydrAMINE Injectable 50 milliGRAM(s) IntraMuscular once PRN Extrapyramidal prophylaxis  haloperidol     Tablet 5 milliGRAM(s) Oral every 6 hours PRN mild to moderate agitation due to bipolar psychosis  haloperidol    Injectable 5 milliGRAM(s) IntraMuscular once PRN severe agitation due to affective psychosis  LORazepam     Tablet 1 milliGRAM(s) Oral every 6 hours PRN mild to moderate anxiety  LORazepam   Injectable 2 milliGRAM(s) IntraMuscular once PRN severe agitation due to affective psychosis  magnesium hydroxide Suspension 30 milliLiter(s) Oral daily PRN Constipation  nicotine  Polacrilex Gum 2 milliGRAM(s) Oral every 2 hours PRN Smoking Cessation  
MEDICATIONS  (STANDING):  lidocaine 2% Viscous 10 milliLiter(s) Swish and Spit three times a day  lithium 150 milliGRAM(s) Oral daily  lithium 600 milliGRAM(s) Oral at bedtime  QUEtiapine 100 milliGRAM(s) Oral at bedtime  senna 2 Tablet(s) Oral at bedtime  triamcinolone 0.1% Cream 1 Application(s) Topical two times a day    MEDICATIONS  (PRN):  acetaminophen     Tablet .. 650 milliGRAM(s) Oral every 6 hours PRN Temp greater or equal to 38C (100.4F), Mild Pain (1 - 3), Moderate Pain (4 - 6)  aluminum hydroxide/magnesium hydroxide/simethicone Suspension 30 milliLiter(s) Oral every 6 hours PRN Dyspepsia  diphenhydrAMINE 50 milliGRAM(s) Oral every 6 hours PRN Extrapyramidal prophylaxis  diphenhydrAMINE Injectable 50 milliGRAM(s) IntraMuscular once PRN Extrapyramidal prophylaxis  haloperidol     Tablet 5 milliGRAM(s) Oral every 6 hours PRN mild to moderate agitation due to bipolar psychosis  haloperidol    Injectable 5 milliGRAM(s) IntraMuscular once PRN severe agitation due to affective psychosis  LORazepam     Tablet 1 milliGRAM(s) Oral every 6 hours PRN mild to moderate anxiety due to bipolar d/o  magnesium hydroxide Suspension 30 milliLiter(s) Oral daily PRN Constipation  nicotine  Polacrilex Gum 2 milliGRAM(s) Oral every 2 hours PRN Smoking Cessation  
MEDICATIONS  (STANDING):  lithium 600 milliGRAM(s) Oral at bedtime  QUEtiapine 100 milliGRAM(s) Oral at bedtime  senna 2 Tablet(s) Oral at bedtime  triamcinolone 0.1% Cream 1 Application(s) Topical two times a day    MEDICATIONS  (PRN):  acetaminophen     Tablet .. 650 milliGRAM(s) Oral every 6 hours PRN Temp greater or equal to 38C (100.4F), Mild Pain (1 - 3), Moderate Pain (4 - 6)  aluminum hydroxide/magnesium hydroxide/simethicone Suspension 30 milliLiter(s) Oral every 6 hours PRN Dyspepsia  diphenhydrAMINE 50 milliGRAM(s) Oral every 6 hours PRN Extrapyramidal prophylaxis  diphenhydrAMINE Injectable 50 milliGRAM(s) IntraMuscular once PRN Extrapyramidal prophylaxis  haloperidol     Tablet 5 milliGRAM(s) Oral every 6 hours PRN mild to moderate agitation due to bipolar psychosis  haloperidol    Injectable 5 milliGRAM(s) IntraMuscular once PRN severe agitation due to affective psychosis  LORazepam     Tablet 1 milliGRAM(s) Oral every 6 hours PRN mild to moderate anxiety  LORazepam   Injectable 2 milliGRAM(s) IntraMuscular once PRN severe agitation due to affective psychosis  magnesium hydroxide Suspension 30 milliLiter(s) Oral daily PRN Constipation  nicotine  Polacrilex Gum 2 milliGRAM(s) Oral every 2 hours PRN Smoking Cessation  
MEDICATIONS  (STANDING):  lithium 600 milliGRAM(s) Oral at bedtime  QUEtiapine 100 milliGRAM(s) Oral at bedtime  senna 2 Tablet(s) Oral at bedtime    MEDICATIONS  (PRN):  acetaminophen     Tablet .. 650 milliGRAM(s) Oral every 6 hours PRN Temp greater or equal to 38C (100.4F), Mild Pain (1 - 3), Moderate Pain (4 - 6)  aluminum hydroxide/magnesium hydroxide/simethicone Suspension 30 milliLiter(s) Oral every 6 hours PRN Dyspepsia  diphenhydrAMINE 50 milliGRAM(s) Oral every 6 hours PRN Extrapyramidal prophylaxis  diphenhydrAMINE Injectable 50 milliGRAM(s) IntraMuscular once PRN Extrapyramidal prophylaxis  haloperidol     Tablet 5 milliGRAM(s) Oral every 6 hours PRN mild to moderate agitation due to bipolar psychosis  haloperidol    Injectable 5 milliGRAM(s) IntraMuscular once PRN severe agitation due to affective psychosis  LORazepam     Tablet 1 milliGRAM(s) Oral every 6 hours PRN mild to moderate anxiety  LORazepam   Injectable 2 milliGRAM(s) IntraMuscular once PRN severe agitation due to affective psychosis  magnesium hydroxide Suspension 30 milliLiter(s) Oral daily PRN Constipation  nicotine  Polacrilex Gum 2 milliGRAM(s) Oral every 2 hours PRN Smoking Cessation  
MEDICATIONS  (STANDING):  lidocaine 2% Viscous 10 milliLiter(s) Swish and Spit three times a day  lithium 150 milliGRAM(s) Oral daily  lithium 600 milliGRAM(s) Oral at bedtime  QUEtiapine 100 milliGRAM(s) Oral at bedtime  senna 2 Tablet(s) Oral at bedtime  triamcinolone 0.1% Cream 1 Application(s) Topical two times a day    MEDICATIONS  (PRN):  acetaminophen     Tablet .. 650 milliGRAM(s) Oral every 6 hours PRN Temp greater or equal to 38C (100.4F), Mild Pain (1 - 3), Moderate Pain (4 - 6)  aluminum hydroxide/magnesium hydroxide/simethicone Suspension 30 milliLiter(s) Oral every 6 hours PRN Dyspepsia  diphenhydrAMINE 50 milliGRAM(s) Oral every 6 hours PRN Extrapyramidal prophylaxis  diphenhydrAMINE Injectable 50 milliGRAM(s) IntraMuscular once PRN Extrapyramidal prophylaxis  haloperidol     Tablet 5 milliGRAM(s) Oral every 6 hours PRN mild to moderate agitation due to bipolar psychosis  haloperidol    Injectable 5 milliGRAM(s) IntraMuscular once PRN severe agitation due to affective psychosis  LORazepam     Tablet 1 milliGRAM(s) Oral every 6 hours PRN mild to moderate anxiety due to bipolar d/o  magnesium hydroxide Suspension 30 milliLiter(s) Oral daily PRN Constipation  nicotine  Polacrilex Gum 2 milliGRAM(s) Oral every 2 hours PRN Smoking Cessation  
MEDICATIONS  (STANDING):  lithium 600 milliGRAM(s) Oral at bedtime  QUEtiapine 100 milliGRAM(s) Oral at bedtime  senna 2 Tablet(s) Oral at bedtime  triamcinolone 0.1% Cream 1 Application(s) Topical two times a day    MEDICATIONS  (PRN):  acetaminophen     Tablet .. 650 milliGRAM(s) Oral every 6 hours PRN Temp greater or equal to 38C (100.4F), Mild Pain (1 - 3), Moderate Pain (4 - 6)  aluminum hydroxide/magnesium hydroxide/simethicone Suspension 30 milliLiter(s) Oral every 6 hours PRN Dyspepsia  diphenhydrAMINE 50 milliGRAM(s) Oral every 6 hours PRN Extrapyramidal prophylaxis  diphenhydrAMINE Injectable 50 milliGRAM(s) IntraMuscular once PRN Extrapyramidal prophylaxis  haloperidol     Tablet 5 milliGRAM(s) Oral every 6 hours PRN mild to moderate agitation due to bipolar psychosis  haloperidol    Injectable 5 milliGRAM(s) IntraMuscular once PRN severe agitation due to affective psychosis  LORazepam     Tablet 1 milliGRAM(s) Oral every 6 hours PRN mild to moderate anxiety  LORazepam   Injectable 2 milliGRAM(s) IntraMuscular once PRN severe agitation due to affective psychosis  magnesium hydroxide Suspension 30 milliLiter(s) Oral daily PRN Constipation  nicotine  Polacrilex Gum 2 milliGRAM(s) Oral every 2 hours PRN Smoking Cessation  
MEDICATIONS  (STANDING):  lidocaine 2% Viscous 10 milliLiter(s) Swish and Spit three times a day  lithium 150 milliGRAM(s) Oral daily  lithium 600 milliGRAM(s) Oral at bedtime  QUEtiapine 100 milliGRAM(s) Oral at bedtime  senna 2 Tablet(s) Oral at bedtime  triamcinolone 0.1% Cream 1 Application(s) Topical two times a day    MEDICATIONS  (PRN):  acetaminophen     Tablet .. 650 milliGRAM(s) Oral every 6 hours PRN Temp greater or equal to 38C (100.4F), Mild Pain (1 - 3), Moderate Pain (4 - 6)  aluminum hydroxide/magnesium hydroxide/simethicone Suspension 30 milliLiter(s) Oral every 6 hours PRN Dyspepsia  diphenhydrAMINE 50 milliGRAM(s) Oral every 6 hours PRN Extrapyramidal prophylaxis  diphenhydrAMINE Injectable 50 milliGRAM(s) IntraMuscular once PRN Extrapyramidal prophylaxis  haloperidol     Tablet 5 milliGRAM(s) Oral every 6 hours PRN mild to moderate agitation due to bipolar psychosis  haloperidol    Injectable 5 milliGRAM(s) IntraMuscular once PRN severe agitation due to affective psychosis  LORazepam     Tablet 1 milliGRAM(s) Oral every 6 hours PRN mild to moderate anxiety due to bipolar d/o  magnesium hydroxide Suspension 30 milliLiter(s) Oral daily PRN Constipation  nicotine  Polacrilex Gum 2 milliGRAM(s) Oral every 2 hours PRN Smoking Cessation  
MEDICATIONS  (STANDING):  lithium 600 milliGRAM(s) Oral at bedtime  QUEtiapine 100 milliGRAM(s) Oral at bedtime  senna 2 Tablet(s) Oral at bedtime    MEDICATIONS  (PRN):  acetaminophen     Tablet .. 650 milliGRAM(s) Oral every 6 hours PRN Temp greater or equal to 38C (100.4F), Mild Pain (1 - 3), Moderate Pain (4 - 6)  aluminum hydroxide/magnesium hydroxide/simethicone Suspension 30 milliLiter(s) Oral every 6 hours PRN Dyspepsia  diphenhydrAMINE 50 milliGRAM(s) Oral every 6 hours PRN Extrapyramidal prophylaxis  diphenhydrAMINE Injectable 50 milliGRAM(s) IntraMuscular once PRN Extrapyramidal prophylaxis  haloperidol     Tablet 5 milliGRAM(s) Oral every 6 hours PRN mild to moderate agitation due to bipolar psychosis  haloperidol    Injectable 5 milliGRAM(s) IntraMuscular once PRN severe agitation due to affective psychosis  LORazepam     Tablet 1 milliGRAM(s) Oral every 6 hours PRN mild to moderate anxiety  LORazepam   Injectable 2 milliGRAM(s) IntraMuscular once PRN severe agitation due to affective psychosis  magnesium hydroxide Suspension 30 milliLiter(s) Oral daily PRN Constipation  nicotine  Polacrilex Gum 2 milliGRAM(s) Oral every 2 hours PRN Smoking Cessation

## 2025-05-27 NOTE — BH INPATIENT PSYCHIATRY PROGRESS NOTE - NSBHCHARTREVIEWVS_PSY_A_CORE FT
Vital Signs Last 24 Hrs  T(C): 36.6 (05-17-25 @ 06:54), Max: 36.6 (05-17-25 @ 06:54)  T(F): 97.8 (05-17-25 @ 06:54), Max: 97.8 (05-17-25 @ 06:54)  HR: --  BP: --  BP(mean): --  RR: 18 (05-17-25 @ 06:54) (18 - 18)  SpO2: 100% (05-17-25 @ 06:54) (100% - 100%)    Orthostatic VS  05-17-25 @ 06:54  Lying BP: --/-- HR: --  Sitting BP: 134/83 HR: 82  Standing BP: 116/83 HR: 97  Site: upper right arm  Mode: electronic  Orthostatic VS  05-16-25 @ 06:54  Lying BP: --/-- HR: --  Sitting BP: 116/68 HR: 98  Standing BP: 105/67 HR: 123  Site: upper right arm  Mode: electronic  
Vital Signs Last 24 Hrs  T(C): 36.4 (05-16-25 @ 06:54), Max: 36.4 (05-16-25 @ 06:54)  T(F): 97.5 (05-16-25 @ 06:54), Max: 97.5 (05-16-25 @ 06:54)  HR: --  BP: --  BP(mean): --  RR: 16 (05-16-25 @ 06:54) (16 - 16)  SpO2: 100% (05-16-25 @ 06:54) (100% - 100%)    Orthostatic VS  05-16-25 @ 06:54  Lying BP: --/-- HR: --  Sitting BP: 116/68 HR: 98  Standing BP: 105/67 HR: 123  Site: upper right arm  Mode: electronic  Orthostatic VS  05-15-25 @ 07:05  Lying BP: --/-- HR: --  Sitting BP: 133/80 HR: 114  Standing BP: 123/91 HR: 115  Site: upper right arm  Mode: electronic  
Vital Signs Last 24 Hrs  T(C): 36.6 (05-27-25 @ 07:15), Max: 36.6 (05-27-25 @ 07:15)  T(F): 97.9 (05-27-25 @ 07:15), Max: 97.9 (05-27-25 @ 07:15)  HR: --  BP: --  BP(mean): --  RR: 16 (05-27-25 @ 07:15) (16 - 16)  SpO2: 100% (05-27-25 @ 07:15) (100% - 100%)    Orthostatic VS  05-27-25 @ 07:15  Lying BP: --/-- HR: --  Sitting BP: 124/77 HR: 82  Standing BP: 106/83 HR: 95  Site: upper right arm  Mode: electronic  Orthostatic VS  05-26-25 @ 07:08  Lying BP: --/-- HR: --  Sitting BP: 115/76 HR: 84  Standing BP: 105/83 HR: 96  Site: upper right arm  Mode: electronic  
Vital Signs Last 24 Hrs  T(C): 36.5 (05-18-25 @ 06:34), Max: 36.5 (05-18-25 @ 06:34)  T(F): 97.7 (05-18-25 @ 06:34), Max: 97.7 (05-18-25 @ 06:34)  HR: --  BP: --  BP(mean): --  RR: 18 (05-18-25 @ 06:34) (18 - 18)  SpO2: 100% (05-18-25 @ 06:34) (100% - 100%)    Orthostatic VS  05-18-25 @ 06:34  Lying BP: --/-- HR: --  Sitting BP: 131/89 HR: 92  Standing BP: 119/79 HR: 105  Site: upper right arm  Mode: electronic  Orthostatic VS  05-17-25 @ 06:54  Lying BP: --/-- HR: --  Sitting BP: 134/83 HR: 82  Standing BP: 116/83 HR: 97  Site: upper right arm  Mode: electronic  
Vital Signs Last 24 Hrs  T(C): 36.6 (05-14-25 @ 07:14), Max: 36.6 (05-14-25 @ 07:14)  T(F): 97.8 (05-14-25 @ 07:14), Max: 97.8 (05-14-25 @ 07:14)  HR: --  BP: --  BP(mean): --  RR: 18 (05-14-25 @ 07:14) (18 - 18)  SpO2: 100% (05-14-25 @ 07:14) (100% - 100%)    Orthostatic VS  05-14-25 @ 07:14  Lying BP: --/-- HR: --  Sitting BP: 123/78 HR: 91  Standing BP: 119/87 HR: 95  Site: upper right arm  Mode: electronic  Orthostatic VS  05-13-25 @ 12:00  Lying BP: --/-- HR: --  Sitting BP: 124/92 HR: 88  Standing BP: 123/94 HR: 96  Site: --  Mode: --  
Vital Signs Last 24 Hrs  T(C): 36.3 (05-22-25 @ 06:56), Max: 36.3 (05-22-25 @ 06:56)  T(F): 97.3 (05-22-25 @ 06:56), Max: 97.3 (05-22-25 @ 06:56)  HR: --  BP: --  BP(mean): --  RR: 16 (05-22-25 @ 06:56) (16 - 16)  SpO2: 100% (05-22-25 @ 06:56) (100% - 100%)    Orthostatic VS  05-22-25 @ 06:56  Lying BP: --/-- HR: --  Sitting BP: 115/74 HR: 79  Standing BP: 104/76 HR: 88  Site: upper right arm  Mode: electronic  Orthostatic VS  05-21-25 @ 06:51  Lying BP: --/-- HR: --  Sitting BP: 115/76 HR: 87  Standing BP: 121/80 HR: 96  Site: upper right arm  Mode: electronic  
Vital Signs Last 24 Hrs  T(C): 36.6 (05-15-25 @ 07:05), Max: 36.6 (05-15-25 @ 07:05)  T(F): 97.8 (05-15-25 @ 07:05), Max: 97.8 (05-15-25 @ 07:05)  HR: --  BP: --  BP(mean): --  RR: 18 (05-15-25 @ 07:05) (18 - 18)  SpO2: 100% (05-15-25 @ 07:05) (100% - 100%)    Orthostatic VS  05-15-25 @ 07:05  Lying BP: --/-- HR: --  Sitting BP: 133/80 HR: 114  Standing BP: 123/91 HR: 115  Site: upper right arm  Mode: electronic  Orthostatic VS  05-14-25 @ 07:14  Lying BP: --/-- HR: --  Sitting BP: 123/78 HR: 91  Standing BP: 119/87 HR: 95  Site: upper right arm  Mode: electronic  
Vital Signs Last 24 Hrs  T(C): 36.3 (05-20-25 @ 06:41), Max: 36.3 (05-20-25 @ 06:41)  T(F): 97.3 (05-20-25 @ 06:41), Max: 97.3 (05-20-25 @ 06:41)  HR: --  BP: --  BP(mean): --  RR: 17 (05-20-25 @ 06:41) (17 - 17)  SpO2: 100% (05-20-25 @ 06:41) (100% - 100%)    Orthostatic VS  05-20-25 @ 06:41  Lying BP: --/-- HR: --  Sitting BP: 124/77 HR: 78  Standing BP: 109/76 HR: 88  Site: upper right arm  Mode: electronic  Orthostatic VS  05-19-25 @ 06:40  Lying BP: --/-- HR: --  Sitting BP: 119/77 HR: 68  Standing BP: 115/79 HR: 97  Site: upper right arm  Mode: electronic  
Vital Signs Last 24 Hrs  T(C): 36.3 (05-21-25 @ 06:51), Max: 36.3 (05-21-25 @ 06:51)  T(F): 97.4 (05-21-25 @ 06:51), Max: 97.4 (05-21-25 @ 06:51)  HR: --  BP: --  BP(mean): --  RR: 16 (05-21-25 @ 06:51) (16 - 16)  SpO2: 100% (05-21-25 @ 06:51) (100% - 100%)    Orthostatic VS  05-21-25 @ 06:51  Lying BP: --/-- HR: --  Sitting BP: 115/76 HR: 87  Standing BP: 121/80 HR: 96  Site: upper right arm  Mode: electronic  Orthostatic VS  05-20-25 @ 06:41  Lying BP: --/-- HR: --  Sitting BP: 124/77 HR: 78  Standing BP: 109/76 HR: 88  Site: upper right arm  Mode: electronic  
Vital Signs Last 24 Hrs  T(C): 36.4 (05-23-25 @ 07:31), Max: 36.4 (05-23-25 @ 07:31)  T(F): 97.6 (05-23-25 @ 07:31), Max: 97.6 (05-23-25 @ 07:31)  HR: --  BP: --  BP(mean): --  RR: 16 (05-23-25 @ 07:31) (16 - 16)  SpO2: 100% (05-23-25 @ 07:31) (100% - 100%)    Orthostatic VS  05-23-25 @ 07:31  Lying BP: --/-- HR: --  Sitting BP: 104/59 HR: 84  Standing BP: 104/65 HR: 89  Site: upper right arm  Mode: electronic  Orthostatic VS  05-22-25 @ 06:56  Lying BP: --/-- HR: --  Sitting BP: 115/74 HR: 79  Standing BP: 104/76 HR: 88  Site: upper right arm  Mode: electronic  
Vital Signs Last 24 Hrs  T(C): 36.4 (05-19-25 @ 06:40), Max: 36.4 (05-19-25 @ 06:40)  T(F): 97.5 (05-19-25 @ 06:40), Max: 97.5 (05-19-25 @ 06:40)  HR: --  BP: --  BP(mean): --  RR: 17 (05-19-25 @ 06:40) (17 - 17)  SpO2: 100% (05-19-25 @ 06:40) (100% - 100%)    Orthostatic VS  05-19-25 @ 06:40  Lying BP: --/-- HR: --  Sitting BP: 119/77 HR: 68  Standing BP: 115/79 HR: 97  Site: upper right arm  Mode: electronic  Orthostatic VS  05-18-25 @ 06:34  Lying BP: --/-- HR: --  Sitting BP: 131/89 HR: 92  Standing BP: 119/79 HR: 105  Site: upper right arm  Mode: electronic

## 2025-05-27 NOTE — BH INPATIENT PSYCHIATRY DISCHARGE NOTE - NSDCCPCAREPLAN_GEN_ALL_CORE_FT
Discontinued ultrasound order. Called patient and left detailed message.    PRINCIPAL DISCHARGE DIAGNOSIS  Diagnosis: Severe bipolar affective disorder with psychosis  Assessment and Plan of Treatment:       SECONDARY DISCHARGE DIAGNOSES  Diagnosis: Cannabis use disorder  Assessment and Plan of Treatment:     Diagnosis: Alcohol use  Assessment and Plan of Treatment:

## 2025-05-27 NOTE — BH INPATIENT PSYCHIATRY DISCHARGE NOTE - NSBHDCMEDICALFT_PSY_A_CORE
mildly elevated cholesterol    NKDA   Ringworm to posterior neck- (Triamcinolone cream ordered as Flagyl interaction with Lithium)

## 2025-05-27 NOTE — BH INPATIENT PSYCHIATRY DISCHARGE NOTE - NSCORESITESY/N_GEN_A_CORE_RD
Patient called to report that HCTZ is entered wrong (directions, quantity, and pharmacy).  Current EPIC med list states 25 mg alternating with 12.5 mg.    She is taking 12.5 mg tabs with and extra 12.5 mg tablet every other day.  Therefore she would need a refill of 135 tabs (90 days) to be sent to [facility].    Dr. Mata to review the dosing and if correct, a refill is to be sent to [facility] as soon as possible.       Yes

## 2025-05-27 NOTE — BH TREATMENT PLAN - NSCMSPTSTRENGTHS_PSY_ALL_CORE
Assertive/Courageous/Expressive of emotions/Financial stability/Future/goal oriented/Highly motivated for treatment/Intact family/Intelligence/Physically healthy/Resourceful/Self-reliant/Strong support system/Supportive family
Assertive/Courageous/Expressive of emotions/Financial stability/Future/goal oriented/Highly motivated for treatment/Intact family/Intelligence/Physically healthy/Positive attitude/Resourceful/Self confidence/Self-reliant/Strong support system/Supportive family
Assertive/Courageous/Expressive of emotions/Financial stability/Future/goal oriented/Highly motivated for treatment/Intact family/Intelligence/Physically healthy/Resourceful/Self-reliant/Strong support system/Supportive family

## 2025-05-27 NOTE — BH INPATIENT PSYCHIATRY PROGRESS NOTE - NSTXSUBMISDATETRGT_PSY_ALL_CORE
21-May-2025

## 2025-05-27 NOTE — BH INPATIENT PSYCHIATRY DISCHARGE NOTE - NSBHFUPINTERVALCCFT_PSY_A_CORE
" I'm doing good. I feel good. Ready to go home."     Lithium level = 0.6 mmol/L ( 5/27/25)  BUN/ Creatinine/ GFR  = 15/1.11 /80

## 2025-05-27 NOTE — BH TREATMENT PLAN - NSTXDISORGGOAL_PSY_ALL_CORE
Will identify 2 coping skills that assist in organizing
Will demonstrate purposeful and predictable thoughts/behaviors by making a request
Will identify 2 coping skills that assist in organizing

## 2025-05-27 NOTE — BH INPATIENT PSYCHIATRY DISCHARGE NOTE - NSDCMRMEDTOKEN_GEN_ALL_CORE_FT
lithium 150 mg oral capsule: 1 cap(s) orally once a day  lithium 600 mg oral capsule: 1 cap(s) orally once a day (at bedtime)  QUEtiapine 100 mg oral tablet: 1 tab(s) orally once a day (at bedtime)  senna leaf extract oral tablet: 2 tab(s) orally once a day (at bedtime)  triamcinolone 0.1% topical cream: 1 Apply topically to affected area 2 times a day

## 2025-05-27 NOTE — BH TREATMENT PLAN - NSTXDISORGINTERRN_PSY_ALL_CORE
The patient is encouraged to continue to adhere to his medication regimen, attent therapy sessions, and report any new or worsening symptoms.
Establish trust/therapeutic rapport to encourage ventilation of feelings.  Provide emotional support.  Assess mood Q shift, document and report changes.  Encourage medication compliance, provide education, and monitor effects.  Encourage participation in unit activities with emphasis on coping/stress management.
Establish trust/therapeutic rapport to encourage ventilation of feelings.  Provide emotional support.  Assess mood Q shift, document and report changes.  Encourage medication compliance, provide education, and monitor effects.  Encourage participation in unit activities with emphasis on coping/stress management.
cervical spine/lumbar spine

## 2025-05-27 NOTE — BH INPATIENT PSYCHIATRY PROGRESS NOTE - PRN MEDS
MEDICATIONS  (PRN):  acetaminophen     Tablet .. 650 milliGRAM(s) Oral every 6 hours PRN Temp greater or equal to 38C (100.4F), Mild Pain (1 - 3), Moderate Pain (4 - 6)  aluminum hydroxide/magnesium hydroxide/simethicone Suspension 30 milliLiter(s) Oral every 6 hours PRN Dyspepsia  diphenhydrAMINE 50 milliGRAM(s) Oral every 6 hours PRN Extrapyramidal prophylaxis  diphenhydrAMINE Injectable 50 milliGRAM(s) IntraMuscular once PRN Extrapyramidal prophylaxis  haloperidol     Tablet 5 milliGRAM(s) Oral every 6 hours PRN mild to moderate agitation due to bipolar psychosis  haloperidol    Injectable 5 milliGRAM(s) IntraMuscular once PRN severe agitation due to affective psychosis  LORazepam     Tablet 1 milliGRAM(s) Oral every 6 hours PRN mild to moderate anxiety  LORazepam   Injectable 2 milliGRAM(s) IntraMuscular once PRN severe agitation due to affective psychosis  magnesium hydroxide Suspension 30 milliLiter(s) Oral daily PRN Constipation  nicotine  Polacrilex Gum 2 milliGRAM(s) Oral every 2 hours PRN Smoking Cessation  
MEDICATIONS  (PRN):  acetaminophen     Tablet .. 650 milliGRAM(s) Oral every 6 hours PRN Temp greater or equal to 38C (100.4F), Mild Pain (1 - 3), Moderate Pain (4 - 6)  aluminum hydroxide/magnesium hydroxide/simethicone Suspension 30 milliLiter(s) Oral every 6 hours PRN Dyspepsia  diphenhydrAMINE 50 milliGRAM(s) Oral every 6 hours PRN Extrapyramidal prophylaxis  diphenhydrAMINE Injectable 50 milliGRAM(s) IntraMuscular once PRN Extrapyramidal prophylaxis  haloperidol     Tablet 5 milliGRAM(s) Oral every 6 hours PRN mild to moderate agitation due to bipolar psychosis  haloperidol    Injectable 5 milliGRAM(s) IntraMuscular once PRN severe agitation due to affective psychosis  LORazepam     Tablet 1 milliGRAM(s) Oral every 6 hours PRN mild to moderate anxiety  LORazepam   Injectable 2 milliGRAM(s) IntraMuscular once PRN severe agitation due to affective psychosis  magnesium hydroxide Suspension 30 milliLiter(s) Oral daily PRN Constipation  nicotine  Polacrilex Gum 2 milliGRAM(s) Oral every 2 hours PRN Smoking Cessation  
MEDICATIONS  (PRN):  acetaminophen     Tablet .. 650 milliGRAM(s) Oral every 6 hours PRN Temp greater or equal to 38C (100.4F), Mild Pain (1 - 3), Moderate Pain (4 - 6)  aluminum hydroxide/magnesium hydroxide/simethicone Suspension 30 milliLiter(s) Oral every 6 hours PRN Dyspepsia  diphenhydrAMINE 50 milliGRAM(s) Oral every 6 hours PRN Extrapyramidal prophylaxis  diphenhydrAMINE Injectable 50 milliGRAM(s) IntraMuscular once PRN Extrapyramidal prophylaxis  haloperidol     Tablet 5 milliGRAM(s) Oral every 6 hours PRN mild to moderate agitation due to bipolar psychosis  haloperidol    Injectable 5 milliGRAM(s) IntraMuscular once PRN severe agitation due to affective psychosis  LORazepam     Tablet 1 milliGRAM(s) Oral every 6 hours PRN mild to moderate anxiety due to bipolar d/o  magnesium hydroxide Suspension 30 milliLiter(s) Oral daily PRN Constipation  nicotine  Polacrilex Gum 2 milliGRAM(s) Oral every 2 hours PRN Smoking Cessation  
MEDICATIONS  (PRN):  acetaminophen     Tablet .. 650 milliGRAM(s) Oral every 6 hours PRN Temp greater or equal to 38C (100.4F), Mild Pain (1 - 3), Moderate Pain (4 - 6)  aluminum hydroxide/magnesium hydroxide/simethicone Suspension 30 milliLiter(s) Oral every 6 hours PRN Dyspepsia  diphenhydrAMINE 50 milliGRAM(s) Oral every 6 hours PRN Extrapyramidal prophylaxis  diphenhydrAMINE Injectable 50 milliGRAM(s) IntraMuscular once PRN Extrapyramidal prophylaxis  haloperidol     Tablet 5 milliGRAM(s) Oral every 6 hours PRN mild to moderate agitation due to bipolar psychosis  haloperidol    Injectable 5 milliGRAM(s) IntraMuscular once PRN severe agitation due to affective psychosis  LORazepam     Tablet 1 milliGRAM(s) Oral every 6 hours PRN mild to moderate anxiety  LORazepam   Injectable 2 milliGRAM(s) IntraMuscular once PRN severe agitation due to affective psychosis  magnesium hydroxide Suspension 30 milliLiter(s) Oral daily PRN Constipation  nicotine  Polacrilex Gum 2 milliGRAM(s) Oral every 2 hours PRN Smoking Cessation  
MEDICATIONS  (PRN):  acetaminophen     Tablet .. 650 milliGRAM(s) Oral every 6 hours PRN Temp greater or equal to 38C (100.4F), Mild Pain (1 - 3), Moderate Pain (4 - 6)  aluminum hydroxide/magnesium hydroxide/simethicone Suspension 30 milliLiter(s) Oral every 6 hours PRN Dyspepsia  diphenhydrAMINE 50 milliGRAM(s) Oral every 6 hours PRN Extrapyramidal prophylaxis  diphenhydrAMINE Injectable 50 milliGRAM(s) IntraMuscular once PRN Extrapyramidal prophylaxis  haloperidol     Tablet 5 milliGRAM(s) Oral every 6 hours PRN mild to moderate agitation due to bipolar psychosis  haloperidol    Injectable 5 milliGRAM(s) IntraMuscular once PRN severe agitation due to affective psychosis  LORazepam     Tablet 1 milliGRAM(s) Oral every 6 hours PRN mild to moderate anxiety due to bipolar d/o  magnesium hydroxide Suspension 30 milliLiter(s) Oral daily PRN Constipation  nicotine  Polacrilex Gum 2 milliGRAM(s) Oral every 2 hours PRN Smoking Cessation  
MEDICATIONS  (PRN):  acetaminophen     Tablet .. 650 milliGRAM(s) Oral every 6 hours PRN Temp greater or equal to 38C (100.4F), Mild Pain (1 - 3), Moderate Pain (4 - 6)  aluminum hydroxide/magnesium hydroxide/simethicone Suspension 30 milliLiter(s) Oral every 6 hours PRN Dyspepsia  diphenhydrAMINE 50 milliGRAM(s) Oral every 6 hours PRN Extrapyramidal prophylaxis  diphenhydrAMINE Injectable 50 milliGRAM(s) IntraMuscular once PRN Extrapyramidal prophylaxis  haloperidol     Tablet 5 milliGRAM(s) Oral every 6 hours PRN mild to moderate agitation due to bipolar psychosis  haloperidol    Injectable 5 milliGRAM(s) IntraMuscular once PRN severe agitation due to affective psychosis  LORazepam     Tablet 1 milliGRAM(s) Oral every 6 hours PRN mild to moderate anxiety  LORazepam   Injectable 2 milliGRAM(s) IntraMuscular once PRN severe agitation due to affective psychosis  magnesium hydroxide Suspension 30 milliLiter(s) Oral daily PRN Constipation  nicotine  Polacrilex Gum 2 milliGRAM(s) Oral every 2 hours PRN Smoking Cessation  
MEDICATIONS  (PRN):  acetaminophen     Tablet .. 650 milliGRAM(s) Oral every 6 hours PRN Temp greater or equal to 38C (100.4F), Mild Pain (1 - 3), Moderate Pain (4 - 6)  aluminum hydroxide/magnesium hydroxide/simethicone Suspension 30 milliLiter(s) Oral every 6 hours PRN Dyspepsia  diphenhydrAMINE 50 milliGRAM(s) Oral every 6 hours PRN Extrapyramidal prophylaxis  diphenhydrAMINE Injectable 50 milliGRAM(s) IntraMuscular once PRN Extrapyramidal prophylaxis  haloperidol     Tablet 5 milliGRAM(s) Oral every 6 hours PRN mild to moderate agitation due to bipolar psychosis  haloperidol    Injectable 5 milliGRAM(s) IntraMuscular once PRN severe agitation due to affective psychosis  LORazepam     Tablet 1 milliGRAM(s) Oral every 6 hours PRN mild to moderate anxiety due to bipolar d/o  magnesium hydroxide Suspension 30 milliLiter(s) Oral daily PRN Constipation  nicotine  Polacrilex Gum 2 milliGRAM(s) Oral every 2 hours PRN Smoking Cessation  
MEDICATIONS  (PRN):  acetaminophen     Tablet .. 650 milliGRAM(s) Oral every 6 hours PRN Temp greater or equal to 38C (100.4F), Mild Pain (1 - 3), Moderate Pain (4 - 6)  aluminum hydroxide/magnesium hydroxide/simethicone Suspension 30 milliLiter(s) Oral every 6 hours PRN Dyspepsia  diphenhydrAMINE 50 milliGRAM(s) Oral every 6 hours PRN Extrapyramidal prophylaxis  diphenhydrAMINE Injectable 50 milliGRAM(s) IntraMuscular once PRN Extrapyramidal prophylaxis  haloperidol     Tablet 5 milliGRAM(s) Oral every 6 hours PRN mild to moderate agitation due to bipolar psychosis  haloperidol    Injectable 5 milliGRAM(s) IntraMuscular once PRN severe agitation due to affective psychosis  LORazepam     Tablet 1 milliGRAM(s) Oral every 6 hours PRN mild to moderate anxiety  LORazepam   Injectable 2 milliGRAM(s) IntraMuscular once PRN severe agitation due to affective psychosis  magnesium hydroxide Suspension 30 milliLiter(s) Oral daily PRN Constipation  nicotine  Polacrilex Gum 2 milliGRAM(s) Oral every 2 hours PRN Smoking Cessation  
MEDICATIONS  (PRN):  acetaminophen     Tablet .. 650 milliGRAM(s) Oral every 6 hours PRN Temp greater or equal to 38C (100.4F), Mild Pain (1 - 3), Moderate Pain (4 - 6)  aluminum hydroxide/magnesium hydroxide/simethicone Suspension 30 milliLiter(s) Oral every 6 hours PRN Dyspepsia  diphenhydrAMINE 50 milliGRAM(s) Oral every 6 hours PRN Extrapyramidal prophylaxis  diphenhydrAMINE Injectable 50 milliGRAM(s) IntraMuscular once PRN Extrapyramidal prophylaxis  haloperidol     Tablet 5 milliGRAM(s) Oral every 6 hours PRN mild to moderate agitation due to bipolar psychosis  haloperidol    Injectable 5 milliGRAM(s) IntraMuscular once PRN severe agitation due to affective psychosis  LORazepam     Tablet 1 milliGRAM(s) Oral every 6 hours PRN mild to moderate anxiety due to bipolar d/o  magnesium hydroxide Suspension 30 milliLiter(s) Oral daily PRN Constipation  nicotine  Polacrilex Gum 2 milliGRAM(s) Oral every 2 hours PRN Smoking Cessation  
MEDICATIONS  (PRN):  acetaminophen     Tablet .. 650 milliGRAM(s) Oral every 6 hours PRN Temp greater or equal to 38C (100.4F), Mild Pain (1 - 3), Moderate Pain (4 - 6)  aluminum hydroxide/magnesium hydroxide/simethicone Suspension 30 milliLiter(s) Oral every 6 hours PRN Dyspepsia  diphenhydrAMINE 50 milliGRAM(s) Oral every 6 hours PRN Extrapyramidal prophylaxis  diphenhydrAMINE Injectable 50 milliGRAM(s) IntraMuscular once PRN Extrapyramidal prophylaxis  haloperidol     Tablet 5 milliGRAM(s) Oral every 6 hours PRN mild to moderate agitation due to bipolar psychosis  haloperidol    Injectable 5 milliGRAM(s) IntraMuscular once PRN severe agitation due to affective psychosis  LORazepam     Tablet 1 milliGRAM(s) Oral every 6 hours PRN mild to moderate anxiety  LORazepam   Injectable 2 milliGRAM(s) IntraMuscular once PRN severe agitation due to affective psychosis  magnesium hydroxide Suspension 30 milliLiter(s) Oral daily PRN Constipation  nicotine  Polacrilex Gum 2 milliGRAM(s) Oral every 2 hours PRN Smoking Cessation  
MEDICATIONS  (PRN):  acetaminophen     Tablet .. 650 milliGRAM(s) Oral every 6 hours PRN Temp greater or equal to 38C (100.4F), Mild Pain (1 - 3), Moderate Pain (4 - 6)  aluminum hydroxide/magnesium hydroxide/simethicone Suspension 30 milliLiter(s) Oral every 6 hours PRN Dyspepsia  diphenhydrAMINE 50 milliGRAM(s) Oral every 6 hours PRN Extrapyramidal prophylaxis  diphenhydrAMINE Injectable 50 milliGRAM(s) IntraMuscular once PRN Extrapyramidal prophylaxis  haloperidol     Tablet 5 milliGRAM(s) Oral every 6 hours PRN mild to moderate agitation due to bipolar psychosis  haloperidol    Injectable 5 milliGRAM(s) IntraMuscular once PRN severe agitation due to affective psychosis  LORazepam     Tablet 1 milliGRAM(s) Oral every 6 hours PRN mild to moderate anxiety due to bipolar d/o  magnesium hydroxide Suspension 30 milliLiter(s) Oral daily PRN Constipation  nicotine  Polacrilex Gum 2 milliGRAM(s) Oral every 2 hours PRN Smoking Cessation

## 2025-05-27 NOTE — BH INPATIENT PSYCHIATRY PROGRESS NOTE - NSBHMSEBEHAV_PSY_A_CORE
Cooperative/Other
Cooperative/Other
Other
Cooperative/Other
Other
Cooperative/Other
Other
Other
Cooperative/Other

## 2025-05-27 NOTE — BH INPATIENT PSYCHIATRY PROGRESS NOTE - NSTXCOPEDATETRGT_PSY_ALL_CORE
24-May-2025
27-May-2025
27-May-2025
20-May-2025
20-May-2025
27-May-2025
24-May-2025
20-May-2025
27-May-2025
27-May-2025
24-May-2025

## 2025-05-27 NOTE — BH INPATIENT PSYCHIATRY DISCHARGE NOTE - OTHER
more judicious reasoning very social but becoming better able to establish and to maintain boundaries affect  more appropriate in range and intensity "I feel good now." thinking becoming more reality based  cooperative in a more euthymic clinical state

## 2025-05-27 NOTE — BH TREATMENT PLAN - NSTXPATIENTPARTICIPATE_PSY_ALL_CORE
Patient participated in identification of needs/problems/goals for treatment/Patient participated in defining interventions/Patient participated in development of after care plan
Patient participated in identification of needs/problems/goals for treatment/Patient participated in defining interventions
Patient participated in identification of needs/problems/goals for treatment/Patient participated in defining interventions/Patient participated in development of after care plan

## 2025-05-27 NOTE — BH INPATIENT PSYCHIATRY PROGRESS NOTE - NSBHMSEKNOWHOW_PSY_ALL_CORE
Current Events/Educational attainment/Vocabulary

## 2025-05-27 NOTE — BH INPATIENT PSYCHIATRY PROGRESS NOTE - NSTXCOPEDATEEST_PSY_ALL_CORE
20-May-2025
13-May-2025
17-May-2025
13-May-2025
20-May-2025
20-May-2025
17-May-2025
17-May-2025
13-May-2025
20-May-2025
20-May-2025

## 2025-05-27 NOTE — BH INPATIENT PSYCHIATRY PROGRESS NOTE - NSTXDISORGINTERMD_PSY_ALL_CORE
1. Informed consent obtained from the pt after pt consent for writer to speak with pt's family and with his fiancee to restart pt Lithium  150 mg po q am and 300 mg po qhs ( bipolar d/o)  2.DC Abilify due to lack of early efficacy  3.Plan to begin Seroquel 50 mg po qhs ( bipolar affective psychosis) to aid with insomnia  4.To titrate med regimen as clinically indicated and tolerated by the pt.  5.Pt encouraged to attend therapy groups as tolerated  6.Dual diagnosis discussion as to recommendation to DC THC gummies and ETOH  7.Disposition planning ongoing
1. Informed consent obtained from the pt after pt consent for writer to speak with pt's family and with his fiancee to restart pt Lithium  150 mg po q am and 300 mg po qhs ( bipolar d/o)  2.DC Abilify due to lack of early efficacy  3.Plan to begin Seroquel 50 mg po qhs ( bipolar affective psychosis) to aid with insomnia  4.To titrate med regimen as clinically indicated and tolerated by the pt.  5.Pt encouraged to attend therapy groups as tolerated  6.Dual diagnosis discussion as to recommendation to DC THC gummies and ETOH  7.Disposition planning ongoing
1. Informed consent obtained from the pt after pt consent for writer to speak with pt's family and with his fiancee to restart pt Lithium  600 mg po qhs ( bipolar d/o)  2.DC Abilify due to lack of early efficacy  3.Plan to titrate  Seroquel  to 100 mg po qhs ( bipolar affective psychosis) to aid with insomnia  4.To titrate med regimen as clinically indicated and tolerated by the pt.  5.Pt encouraged to attend therapy groups as tolerated  6.Dual diagnosis discussion as to recommendation to DC THC gummies and ETOH  7.Disposition planning ongoing
1. Informed consent obtained from the pt after pt consent for writer to speak with pt's family and with his fiancee to restart pt Lithium  150 mg po q am and 300 mg po qhs ( bipolar d/o)  2.DC Abilify due to lack of early efficacy  3.Plan to begin Seroquel 50 mg po qhs ( bipolar affective psychosis) to aid with insomnia  4.To titrate med regimen as clinically indicated and tolerated by the pt.  5.Pt encouraged to attend therapy groups as tolerated  6.Dual diagnosis discussion as to recommendation to DC THC gummies and ETOH  7.Disposition planning ongoing
1. Informed consent obtained from the pt after pt consent for writer to speak with pt's family and with his fiancee to restart pt Lithium  150 mg po q am and 300 mg po qhs ( bipolar d/o)  2.DC Abilify due to lack of early efficacy  3.Plan to begin Seroquel 50 mg po qhs ( bipolar affective psychosis) to aid with insomnia  4.To titrate med regimen as clinically indicated and tolerated by the pt.  5.Pt encouraged to attend therapy groups as tolerated  6.Dual diagnosis discussion as to recommendation to DC THC gummies and ETOH  7.Disposition planning ongoing
1. Informed consent obtained from the pt after pt consent for writer to speak with pt's family and with his fiancee to restart pt Lithium  600 mg po qhs ( bipolar d/o)  2.DC Abilify due to lack of early efficacy  3.Plan to titrate  Seroquel  to 100 mg po qhs ( bipolar affective psychosis) to aid with insomnia  4.To titrate med regimen as clinically indicated and tolerated by the pt.  5.Pt encouraged to attend therapy groups as tolerated  6.Dual diagnosis discussion as to recommendation to DC THC gummies and ETOH  7.Disposition planning ongoing
1. Informed consent obtained from the pt after pt consent for writer to speak with pt's family and with his fiancee to restart pt Lithium  150 mg po q am and 300 mg po qhs ( bipolar d/o)  2.DC Abilify due to lack of early efficacy  3.Plan to begin Seroquel 50 mg po qhs ( bipolar affective psychosis) to aid with insomnia  4.To titrate med regimen as clinically indicated and tolerated by the pt.  5.Pt encouraged to attend therapy groups as tolerated  6.Dual diagnosis discussion as to recommendation to DC THC gummies and ETOH  7.Disposition planning ongoing
1. Informed consent obtained from the pt after pt consent for writer to speak with pt's family and with his fiancee to restart pt Lithium  150 mg po q am and 300 mg po qhs ( bipolar d/o)  2.DC Abilify due to lack of early efficacy  3.Plan to begin Seroquel 50 mg po qhs ( bipolar affective psychosis) to aid with insomnia  4.To titrate med regimen as clinically indicated and tolerated by the pt.  5.Pt encouraged to attend therapy groups as tolerated  6.Dual diagnosis discussion as to recommendation to DC THC gummies and ETOH  7.Disposition planning ongoing
1. Informed consent obtained from the pt after pt consent for writer to speak with pt's family and with his fiancee to restart pt Lithium  600 mg po qhs ( bipolar d/o)  2.DC Abilify due to lack of early efficacy  3.Plan to titrate  Seroquel  to 100 mg po qhs ( bipolar affective psychosis) to aid with insomnia  4.To titrate med regimen as clinically indicated and tolerated by the pt.  5.Pt encouraged to attend therapy groups as tolerated  6.Dual diagnosis discussion as to recommendation to DC THC gummies and ETOH  7.Disposition planning ongoing
1. Informed consent obtained from the pt after pt consent for writer to speak with pt's family and with his fiancee to restart pt Lithium  150 mg po q am and 300 mg po qhs ( bipolar d/o)  2.DC Abilify due to lack of early efficacy  3.Plan to begin Seroquel 50 mg po qhs ( bipolar affective psychosis) to aid with insomnia  4.To titrate med regimen as clinically indicated and tolerated by the pt.  5.Pt encouraged to attend therapy groups as tolerated  6.Dual diagnosis discussion as to recommendation to DC THC gummies and ETOH  7.Disposition planning ongoing

## 2025-05-28 NOTE — CHART NOTE - NSCHARTNOTEFT_GEN_A_CORE
Follow up call made at [phone]. Pt answered and shared that he is doing well and made it home safe. He was encouraged to call the unit with any questions or concerns.

## 2025-05-29 DIAGNOSIS — F31.5 BIPOLAR DISORDER, CURRENT EPISODE DEPRESSED, SEVERE, WITH PSYCHOTIC FEATURES: ICD-10-CM

## 2025-05-29 DIAGNOSIS — F12.99 CANNABIS USE, UNSPECIFIED WITH UNSPECIFIED CANNABIS-INDUCED DISORDER: ICD-10-CM

## 2025-05-29 DIAGNOSIS — F17.200 NICOTINE DEPENDENCE, UNSPECIFIED, UNCOMPLICATED: ICD-10-CM

## 2025-05-29 DIAGNOSIS — F10.90 ALCOHOL USE, UNSPECIFIED, UNCOMPLICATED: ICD-10-CM
